# Patient Record
Sex: FEMALE | Race: BLACK OR AFRICAN AMERICAN | Employment: OTHER | URBAN - METROPOLITAN AREA
[De-identification: names, ages, dates, MRNs, and addresses within clinical notes are randomized per-mention and may not be internally consistent; named-entity substitution may affect disease eponyms.]

---

## 2017-01-04 ENCOUNTER — HOSPITAL ENCOUNTER (OUTPATIENT)
Dept: MAMMOGRAPHY | Age: 57
Discharge: HOME | End: 2017-01-04
Attending: FAMILY MEDICINE
Payer: COMMERCIAL

## 2017-01-04 DIAGNOSIS — Z12.31 VISIT FOR SCREENING MAMMOGRAM: ICD-10-CM

## 2017-01-04 PROCEDURE — 77067 SCR MAMMO BI INCL CAD: CPT

## 2017-01-23 ENCOUNTER — OFFICE VISIT (OUTPATIENT)
Dept: ORTHOPEDIC SURGERY | Age: 57
End: 2017-01-23

## 2017-01-23 VITALS
RESPIRATION RATE: 14 BRPM | DIASTOLIC BLOOD PRESSURE: 84 MMHG | HEIGHT: 62 IN | SYSTOLIC BLOOD PRESSURE: 130 MMHG | WEIGHT: 203.6 LBS | HEART RATE: 71 BPM | TEMPERATURE: 98 F | BODY MASS INDEX: 37.47 KG/M2

## 2017-01-23 DIAGNOSIS — M51.36 DDD (DEGENERATIVE DISC DISEASE), LUMBAR: ICD-10-CM

## 2017-01-23 DIAGNOSIS — M54.50 ACUTE MIDLINE LOW BACK PAIN WITHOUT SCIATICA: Primary | ICD-10-CM

## 2017-01-23 DIAGNOSIS — M47.816 LUMBAR FACET ARTHROPATHY: ICD-10-CM

## 2017-01-23 DIAGNOSIS — M62.830 MUSCLE SPASM OF BACK: ICD-10-CM

## 2017-01-23 RX ORDER — PREDNISONE 10 MG/1
TABLET ORAL
Qty: 11 TAB | Refills: 0 | Status: SHIPPED | OUTPATIENT
Start: 2017-01-23 | End: 2017-03-06 | Stop reason: ALTCHOICE

## 2017-01-23 RX ORDER — METHOCARBAMOL 750 MG/1
750 TABLET, FILM COATED ORAL 4 TIMES DAILY
Qty: 60 TAB | Refills: 1 | Status: SHIPPED | OUTPATIENT
Start: 2017-01-23 | End: 2017-03-06 | Stop reason: SDUPTHER

## 2017-01-23 NOTE — PROGRESS NOTES
[facility]  [address] [place], [address]  Phone: [phone]  Fax: [phone]          HISTORY OF PRESENT ILLNESS:  Jose Roberto Wick is a 60 y.o. female with history of cervical pain. Pt was last seen about 2 years ago for cervical pain. Today she c/o lower back pain but denies any pain radiating down the legs. Pt states that prior to [place] she woke up with severe pain in the lower back and muscle spasms. She went to the ER and was prescribed Flexeril. Pt states that she her pain wore off, she experienced some  muscle spasms in the legs. She reports that her pain improved but she experienced an acute exacerbation of pain again about 2 weeks ago. Pt reports that her lower back pain is most severe when waking in the morning. She has been taking ibuprofen 800 mg with benefit. Pt admits that it takes some time for the Flexeril to become effective. She admits to sedation when first taking the Flexeril. Pt has tried Robaxin in the past but is unsure if it was effective. Pt at this time desires to proceed with medication evaluation. Pain Scale: 1/10    PCP: Juan Diego Cuevas MD       Past Medical History   Diagnosis Date    Cellulitis of breast, left     Degenerative cervical disc     Dyslipidemia     Goiter     History of echocardiogram 12/16/2011     EF 65-70%. RVSP 30-35. No significant valvular heart disease.  Hypercholesterolemia     Hypertension     Hypertension     Left leg pain     Lower extremity peripheral arterial testing 01/30/2012     No peripheral arterial disease bilaterally at rest.  Exercise deferred. R IVORY 1.06.  L IVORY 1.06.    Normal nuclear stress test 08/11/2005     No ischemia or prior infarction. EF 76%. No reg'l WMA. Neg EKG on max EST. Ex time 7 mins.     Thyroid disease         Social History     Social History    Marital status: SINGLE     Spouse name: N/A    Number of children: N/A    Years of education: N/A     Occupational History    Not on file. Social History Main Topics    Smoking status: Former Smoker     Packs/day: 0.50     Years: 15.00     Quit date: 12/16/2010    Smokeless tobacco: Never Used    Alcohol use Yes      Comment: occasionally    Drug use: No    Sexual activity: Not on file     Other Topics Concern    Not on file     Social History Narrative       Current Outpatient Prescriptions   Medication Sig Dispense Refill    predniSONE (DELTASONE) 10 mg tablet 2 pills in am for 3 days, then 1 pill in am for 3 days and 1/2 pill in am for remainder 11 Tab 0    methocarbamol (ROBAXIN) 750 mg tablet Take 1 Tab by mouth four (4) times daily. Indications: MUSCLE SPASM 60 Tab 1    methocarbamol (ROBAXIN-750) 750 mg tablet Take 1 Tab by mouth three (3) times daily. 15 Tab 0    cholecalciferol, vitamin D3, (VITAMIN D3) 2,000 unit tab Take  by mouth.  esomeprazole (NEXIUM) 40 mg capsule Take  by mouth daily.  amLODIPine-benazepril (LOTREL) 5-40 mg per capsule Take 1 Cap by mouth daily.  aspirin 81 mg tablet Take 81 mg by mouth daily.  Calcium Carbonate-Vit D3-Min (CALTRATE 600+D PLUS MINERALS) 600 mg calcium- 400 unit Tab Take  by mouth two (2) times a day.  simvastatin (ZOCOR) 40 mg tablet Take 40 mg by mouth nightly. Allergies   Allergen Reactions    Ampicillin Rash    Codeine Itching    Ibuprofen Nausea Only    Pcn [Penicillins] Rash    Sulfa (Sulfonamide Antibiotics) Itching    Vicodin [Hydrocodone-Acetaminophen] Itching         REVIEW OF SYSTEMS    Constitutional: Negative for fever, chills, or weight change. Respiratory: Negative for cough or shortness of breath. Cardiovascular: Negative for chest pain or palpitations. Gastrointestinal: Negative for acid reflux, change in bowel habits, or constipation. Genitourinary: Negative for dysuria and flank pain. Musculoskeletal: Positive for lumbar pain. Skin: Negative for rash. Neurological: Negative for headaches, dizziness, or numbness. Endo/Heme/Allergies: Negative for increased bruising. Psychiatric/Behavioral: Negative for difficulty with sleep. PHYSICAL EXAMINATION  Visit Vitals    /84    Pulse 71    Temp 98 °F (36.7 °C) (Oral)    Resp 14    Ht 5' 2\" (1.575 m)    Wt 203 lb 9.6 oz (92.4 kg)    BMI 37.24 kg/m2       Constitutional: Awake, alert, and in no acute distress  Neurological: 1+ symmetrical DTRs in the lower extremities. Sensation to light touch is intact. Skin: warm, dry, and intact. Musculoskeletal: Tenderness to palpation in the lower lumbar region. Mild pain with extension, axial loading, no pain with forward flexion. No pain with internal or external rotation of her hips. Negative straight leg raise bilaterally. Hip Flex  Quads Hamstrings Ankle DF EHL Ankle PF   Right +4/5 +4/5 +4/5 +4/5 +4/5 +4/5   Left +4/5 +4/5 +4/5 +4/5 +4/5 +4/5     IMAGING:    Cervical Spine MRI from 06/24/2015 was personally reviewed with the Pt and demonstrated:    Results from Hospital Encounter encounter on 06/24/15   MRI CERV SPINE WO CONT   Narrative MRI Of Cervical Spine Without Contrast    CPT CODE: 63314    HISTORY: Increased left upper extremity radiculopathy and weakness, C6 and C7  distribution. COMPARISON: None. TECHNIQUE: Sagittal T1, T2, STIR; axial T2 and axial T2 medic. FINDINGS:     Status post C5-6 anterior cervical interbody fusion. Normal alignment and  vertebral body heights maintained. No pathologic marrow signal is seen. No cord  signal abnormality. Visualized posterior fossa contents are unremarkable. Paravertebral space is unremarkable. Partially imaged heterogeneous T2  hyperintense left paratracheal mass measuring roughly 2 cm may represent a  thyroid nodule. Spinal canal and neural foramen:     C2-3: Tiny central disc protrusion without spinal stenosis.     C3-4: Small central disc protrusion causing mild spinal stenosis and minimally  indenting the ventral cord. No foraminal stenosis. C4-5: Nonfocal disc protrusion with marginal osteophytic bar and uncovertebral  spurring. Bilateral facet hypertrophy. Mild to moderate spinal canal stenosis. Moderate bilateral foraminal stenoses. C5-6: Status post fusion with no spinal canal or foraminal stenosis. C6-7: Moderately sized central disc extrusion causing mild to moderate spinal  stenosis and mildly flattening the ventral cord. Uncovertebral spurring more  prominent on the left causing moderate foraminal stenosis. C7-T1: No significant disc disease or spinal stenosis. Impression IMPRESSION:    Central disc protrusion causing mild spinal stenosis at C3-4. Nonfocal disc protrusion and marginal osteophyte at C4-5 causing mild to  moderate spinal canal and moderate bilateral foraminal stenoses. Moderately sized disc extrusion at C6-7 causing mild to moderate spinal stenosis  and uncovertebral spurring causing moderate left foraminal stenosis. C5-6 ACDF with no spinal stenosis at this level. Lumbar Spine 4V X-rays from 01/23/2017 were personally reviewed with the Pt and demonstrated:  1) Multilevel degenerative facets. 2) Mild degenerative disc at L5-S1. 3) Calcification aorta. 4) Good alignment. Robert Pearce was seen today for back pain and follow-up. Diagnoses and all orders for this visit:    Acute midline low back pain without sciatica  -     [[id number]] LS Spine 4V  -     predniSONE (DELTASONE) 10 mg tablet; 2 pills in am for 3 days, then 1 pill in am for 3 days and 1/2 pill in am for remainder  -     methocarbamol (ROBAXIN) 750 mg tablet; Take 1 Tab by mouth four (4) times daily. Indications:  MUSCLE SPASM    Lumbar facet arthropathy (HCC)  -     predniSONE (DELTASONE) 10 mg tablet; 2 pills in am for 3 days, then 1 pill in am for 3 days and 1/2 pill in am for remainder    DDD (degenerative disc disease), lumbar    Muscle spasm of back  - methocarbamol (ROBAXIN) 750 mg tablet; Take 1 Tab by mouth four (4) times daily. Indications: MUSCLE SPASM         IMPRESSION AND PLAN:  Librado Keller is a 60 y.o. female with history of cervical pain. Pt was last seen about 2 years ago for cervical pain but today she c/o lower back pain. Pt states that prior to [place] she woke up with severe pain in the lower back and muscle spasms. She went to the ER and was prescribed Robaxin 750 mg. She states that her pain improved but she experienced an acute exacerbation of pain again about 2 weeks ago. Pt reports that her lower back pain is most severe when waking in the morning. 1) Pt was given information on lumbar exercises. 2) She was offered physical therapy but declined. 3) Pt was prescribed a prednisone taper. 4) She will continue taking ibuprofen 800 mg as prescribed but I instructed her to take no more than 2 tabs daily. 5) Pt was prescribed Robaxin 750 mg 1 tab 4 x daily prn muscle spasm. 6) I recommended the Pt try water exercise. 7) Ms. Jayna John has a reminder for a \"due or due soon\" health maintenance. I have asked that she contact her primary care provider, Scott Jara MD,  for follow-up on this health maintenance. 9)  reviewed. 9) Pt will follow-up as needed.       Written by Heath Natarajan, as dictated by Karolyn Luz MD.

## 2017-01-23 NOTE — MR AVS SNAPSHOT
Visit Information Date & Time Provider Department Dept. Phone Encounter #  
 1/23/2017  8:45 AM Louis Newman MD [facility] [phone] [id number] Follow-up Instructions Return if symptoms worsen or fail to improve. Routing History Upcoming Health Maintenance Date Due Hepatitis C Screening 1960 DTaP/Tdap/Td series (1 - Tdap) 7/10/1979 PAP AKA CERVICAL CYTOLOGY 7/10/1979 FOBT Q 1 YEAR AGE 50-75 7/10/2008 INFLUENZA AGE 9 TO ADULT 8/1/2016 BREAST CANCER SCRN MAMMOGRAM 1/4/2019 Allergies as of 1/23/2017  Review Complete On: 1/23/2017 By: Louis Newman MD  
  
 Severity Noted Reaction Type Reactions Ampicillin  08/26/2011    Rash Codeine  08/26/2011    Itching Ibuprofen    Nausea Only Pcn [Penicillins]  08/26/2011    Rash  
 Sulfa (Sulfonamide Antibiotics)  08/26/2011    Itching Vicodin [Hydrocodone-acetaminophen]  02/25/2013   Side Effect Itching Current Immunizations  Never Reviewed No immunizations on file. Not reviewed this visit You Were Diagnosed With   
  
 Codes Comments Acute midline low back pain without sciatica    -  Primary ICD-10-CM: M54.5 ICD-9-CM: 724.2 Lumbar facet arthropathy (HCC)     ICD-10-CM: M12.88 ICD-9-CM: 721.3 DDD (degenerative disc disease), lumbar     ICD-10-CM: M51.36 
ICD-9-CM: 722.52 Muscle spasm of back     ICD-10-CM: Y12.832 ICD-9-CM: 724.8 Vitals BP Pulse Temp Resp Height(growth percentile) Weight(growth percentile) 130/84 71 98 °F (36.7 °C) (Oral) 14 5' 2\" (1.575 m) 203 lb 9.6 oz (92.4 kg) BMI OB Status Smoking Status 37.24 kg/m2 Hysterectomy Former Smoker BMI and BSA Data Body Mass Index Body Surface Area  
 37.24 kg/m 2 2.01 m 2 Preferred Pharmacy Pharmacy Name Phone [phone] [address] [phone] Your Updated Medication List  
  
   
This list is accurate as of: 17 10:32 AM.  Always use your most recent med list. amLODIPine-benazepril 5-40 mg per capsule Commonly known as:  Lissy Muff Take 1 Cap by mouth daily. aspirin 81 mg tablet Take 81 mg by mouth daily. CALTRATE 600+D PLUS MINERALS 600 mg calcium- 400 unit Tab Generic drug:  Calcium Carbonate-Vit D3-Min Take  by mouth two (2) times a day. * methocarbamol 750 mg tablet Commonly known as:  [id number]-344 Take 1 Tab by mouth three (3) times daily. * methocarbamol 750 mg tablet Commonly known as:  ROBAXIN Take 1 Tab by mouth four (4) times daily. Indications: MUSCLE SPASM NexIUM 40 mg capsule Generic drug:  esomeprazole Take  by mouth daily. predniSONE 10 mg tablet Commonly known as:  DELTASONE  
2 pills in am for 3 days, then 1 pill in am for 3 days and 1/2 pill in am for remainder  
  
 simvastatin 40 mg tablet Commonly known as:  ZOCOR Take 40 mg by mouth nightly. VITAMIN D3 2,000 unit Tab Generic drug:  cholecalciferol (vitamin D3) Take  by mouth. * Notice: This list has 2 medication(s) that are the same as other medications prescribed for you. Read the directions carefully, and ask your doctor or other care provider to review them with you. Prescriptions Printed Refills  
 methocarbamol (ROBAXIN) 750 mg tablet 1 Sig: Take 1 Tab by mouth four (4) times daily. Indications: MUSCLE SPASM Class: Print Route: Oral  
  
Prescriptions Sent to Pharmacy Refills  
 predniSONE (DELTASONE) 10 mg tablet 0 Si pills in am for 3 days, then 1 pill in am for 3 days and 1/2 pill in am for remainder Class: Normal  
 Pharmacy: [address]  #: [phone] We Performed the Following AMB POC XRAY, SPINE, LUMBOSACRAL; 4+ [id number] CPT(R)] Follow-up Instructions Return if symptoms worsen or fail to improve. Patient Instructions Low Back Arthritis: Exercises Your Care Instructions Here are some examples of typical rehabilitation exercises for your condition. Start each exercise slowly. Ease off the exercise if you start to have pain. Your doctor or physical therapist will tell you when you can start these exercises and which ones will work best for you. When you are not being active, find a comfortable position for rest. Some people are comfortable on the floor or a medium-firm bed with a small pillow under their head and another under their knees. Some people prefer to lie on their side with a pillow between their knees. Don't stay in one position for too long. Take short walks (10 to 20 minutes) every 2 to 3 hours. Avoid slopes, hills, and stairs until you feel better. Walk only distances you can manage without pain, especially leg pain. How to do the exercises Pelvic tilt 1. Lie on your back with your knees bent. 2. \"Brace\" your stomachtighten your muscles by pulling in and imagining your belly button moving toward your spine. 3. Press your lower back into the floor. You should feel your hips and pelvis rock back. 4. Hold for 6 seconds while breathing smoothly. 5. Relax and allow your pelvis and hips to rock forward. 6. Repeat 8 to 12 times. Back stretches 1. Get down on your hands and knees on the floor. 2. Relax your head and allow it to droop. Round your back up toward the ceiling until you feel a nice stretch in your upper, middle, and lower back. Hold this stretch for as long as it feels comfortable, or about 15 to 30 seconds. 3. Return to the starting position with a flat back while you are on your hands and knees. 4. Let your back sway by pressing your stomach toward the floor. Lift your buttocks toward the ceiling. 5. Hold this position for 15 to 30 seconds. 6. Repeat 2 to 4 times. Follow-up care is a key part of your treatment and safety. Be sure to make and go to all appointments, and call your doctor if you are having problems. It's also a good idea to know your test results and keep a list of the medicines you take. Where can you learn more? Go to http://wilson-josue.info/. Enter V312 in the search box to learn more about \"Low Back Arthritis: Exercises. \" Current as of: May 23, 2016 Content Version: 11.1 © 9105-1347 [employer]. Care instructions adapted under license by [employer] (which disclaims liability or warranty for this information). If you have questions about a medical condition or this instruction, always ask your healthcare professional. [address] any warranty or liability for your use of this information. Introducing [facility]! Cheko Mccullough introduces [employer] patient portal. Now you can access parts of your medical record, email your doctor's office, and request medication refills online. 1. In your internet browser, go to [email or web address] 2. Click on the First Time User? Click Here link in the Sign In box. You will see the New Member Sign Up page. 3. Enter your [employer] Access Code exactly as it appears below. You will not need to use this code after youve completed the sign-up process. If you do not sign up before the expiration date, you must request a new code. · [employer] Access Code: [email or web address] Expires: 1/25/2017 12:36 PM 
 
4. Enter the last four digits of your Social Security Number (xxxx) and Date of Birth (mm/dd/yyyy) as indicated and click Submit. You will be taken to the next sign-up page. 5. Create a cuaQeat ID. This will be your [employer] login ID and cannot be changed, so think of one that is secure and easy to remember. 6. Create a cuaQeat password. You can change your password at any time. 7. Enter your Password Reset Question and Answer. This can be used at a later time if you forget your password. 8. Enter your e-mail address. You will receive e-mail notification when new information is available in [address] 9. Click Sign Up. You can now view and download portions of your medical record. 10. Click the Download Summary menu link to download a portable copy of your medical information. If you have questions, please visit the Frequently Asked Questions section of the [employer] website. Remember, [employer] is NOT to be used for urgent needs. For medical emergencies, dial 911. Now available from your iPhone and Android! Please provide this summary of care documentation to your next provider. Your primary care clinician is listed as Charito Reddy. If you have any questions after today's visit, please call [phone].

## 2017-01-23 NOTE — PATIENT INSTRUCTIONS
Low Back Arthritis: Exercises  Your Care Instructions  Here are some examples of typical rehabilitation exercises for your condition. Start each exercise slowly. Ease off the exercise if you start to have pain. Your doctor or physical therapist will tell you when you can start these exercises and which ones will work best for you. When you are not being active, find a comfortable position for rest. Some people are comfortable on the floor or a medium-firm bed with a small pillow under their head and another under their knees. Some people prefer to lie on their side with a pillow between their knees. Don't stay in one position for too long. Take short walks (10 to 20 minutes) every 2 to 3 hours. Avoid slopes, hills, and stairs until you feel better. Walk only distances you can manage without pain, especially leg pain. How to do the exercises  Pelvic tilt    1. Lie on your back with your knees bent. 2. \"Brace\" your stomach--tighten your muscles by pulling in and imagining your belly button moving toward your spine. 3. Press your lower back into the floor. You should feel your hips and pelvis rock back. 4. Hold for 6 seconds while breathing smoothly. 5. Relax and allow your pelvis and hips to rock forward. 6. Repeat 8 to 12 times. Back stretches    1. Get down on your hands and knees on the floor. 2. Relax your head and allow it to droop. Round your back up toward the ceiling until you feel a nice stretch in your upper, middle, and lower back. Hold this stretch for as long as it feels comfortable, or about 15 to 30 seconds. 3. Return to the starting position with a flat back while you are on your hands and knees. 4. Let your back sway by pressing your stomach toward the floor. Lift your buttocks toward the ceiling. 5. Hold this position for 15 to 30 seconds. 6. Repeat 2 to 4 times. Follow-up care is a key part of your treatment and safety.  Be sure to make and go to all appointments, and call your doctor if you are having problems. It's also a good idea to know your test results and keep a list of the medicines you take. Where can you learn more? Go to http://wilson-josue.info/. Enter F662 in the search box to learn more about \"Low Back Arthritis: Exercises. \"  Current as of: May 23, 2016  Content Version: 11.1  © 5058-0005 [employer]. Care instructions adapted under license by [employer] (which disclaims liability or warranty for this information). If you have questions about a medical condition or this instruction, always ask your healthcare professional. [address] any warranty or liability for your use of this information.

## 2017-03-06 ENCOUNTER — OFFICE VISIT (OUTPATIENT)
Dept: ORTHOPEDIC SURGERY | Age: 57
End: 2017-03-06

## 2017-03-06 VITALS
HEART RATE: 95 BPM | HEIGHT: 62 IN | WEIGHT: 200 LBS | TEMPERATURE: 97.8 F | RESPIRATION RATE: 18 BRPM | BODY MASS INDEX: 36.8 KG/M2 | DIASTOLIC BLOOD PRESSURE: 74 MMHG | SYSTOLIC BLOOD PRESSURE: 108 MMHG

## 2017-03-06 DIAGNOSIS — M62.830 MUSCLE SPASM OF BACK: ICD-10-CM

## 2017-03-06 DIAGNOSIS — M54.50 ACUTE MIDLINE LOW BACK PAIN WITHOUT SCIATICA: ICD-10-CM

## 2017-03-06 DIAGNOSIS — M51.36 DDD (DEGENERATIVE DISC DISEASE), LUMBAR: ICD-10-CM

## 2017-03-06 DIAGNOSIS — M62.838 MUSCLE SPASM: ICD-10-CM

## 2017-03-06 DIAGNOSIS — M47.816 LUMBAR FACET ARTHROPATHY: Primary | ICD-10-CM

## 2017-03-06 RX ORDER — CYCLOBENZAPRINE HCL 10 MG
TABLET ORAL
COMMUNITY
End: 2017-04-20 | Stop reason: ALTCHOICE

## 2017-03-06 RX ORDER — METHOCARBAMOL 750 MG/1
750 TABLET, FILM COATED ORAL 4 TIMES DAILY
Qty: 60 TAB | Refills: 1 | Status: SHIPPED | OUTPATIENT
Start: 2017-03-06 | End: 2019-05-02 | Stop reason: ALTCHOICE

## 2017-03-06 RX ORDER — PREDNISONE 10 MG/1
TABLET ORAL
Qty: 11 TAB | Refills: 0 | Status: SHIPPED | OUTPATIENT
Start: 2017-03-06 | End: 2017-05-24 | Stop reason: ALTCHOICE

## 2017-03-06 RX ORDER — TRAMADOL HYDROCHLORIDE 50 MG/1
TABLET ORAL
Qty: 30 TAB | Refills: 0 | Status: SHIPPED | OUTPATIENT
Start: 2017-03-06 | End: 2017-04-20 | Stop reason: SDUPTHER

## 2017-03-06 NOTE — PATIENT INSTRUCTIONS
Low Back Arthritis: Exercises  Your Care Instructions  Here are some examples of typical rehabilitation exercises for your condition. Start each exercise slowly. Ease off the exercise if you start to have pain. Your doctor or physical therapist will tell you when you can start these exercises and which ones will work best for you. When you are not being active, find a comfortable position for rest. Some people are comfortable on the floor or a medium-firm bed with a small pillow under their head and another under their knees. Some people prefer to lie on their side with a pillow between their knees. Don't stay in one position for too long. Take short walks (10 to 20 minutes) every 2 to 3 hours. Avoid slopes, hills, and stairs until you feel better. Walk only distances you can manage without pain, especially leg pain. How to do the exercises  Pelvic tilt    1. Lie on your back with your knees bent. 2. \"Brace\" your stomach--tighten your muscles by pulling in and imagining your belly button moving toward your spine. 3. Press your lower back into the floor. You should feel your hips and pelvis rock back. 4. Hold for 6 seconds while breathing smoothly. 5. Relax and allow your pelvis and hips to rock forward. 6. Repeat 8 to 12 times. Back stretches    1. Get down on your hands and knees on the floor. 2. Relax your head and allow it to droop. Round your back up toward the ceiling until you feel a nice stretch in your upper, middle, and lower back. Hold this stretch for as long as it feels comfortable, or about 15 to 30 seconds. 3. Return to the starting position with a flat back while you are on your hands and knees. 4. Let your back sway by pressing your stomach toward the floor. Lift your buttocks toward the ceiling. 5. Hold this position for 15 to 30 seconds. 6. Repeat 2 to 4 times. Follow-up care is a key part of your treatment and safety.  Be sure to make and go to all appointments, and call your doctor if you are having problems. It's also a good idea to know your test results and keep a list of the medicines you take. Where can you learn more? Go to http://wilson-josue.info/. Enter H975 in the search box to learn more about \"Low Back Arthritis: Exercises. \"  Current as of: May 23, 2016  Content Version: 11.1  © 6210-6755 [employer]. Care instructions adapted under license by [employer] (which disclaims liability or warranty for this information). If you have questions about a medical condition or this instruction, always ask your healthcare professional. [place] [address] any warranty or liability for your use of this information.

## 2017-03-06 NOTE — LETTER
NOTIFICATION RETURN TO WORK / SCHOOL 
 
3/6/2017 2:52 PM 
 
Ms. Anitra Jefferson [address] [place] [id number] To Whom It May Concern: 
 
Anitra Jefferson is currently under the care of [address] [facility]. She was seen in our office today and may return to work on 03/07/2017. If there are questions or concerns please have the patient contact our office. Sincerely, Flori Cuevas MD

## 2017-03-06 NOTE — PROGRESS NOTES
[facility]  [address] [place], [address]  Phone: [phone]  Fax: [phone]          HISTORY OF PRESENT ILLNESS:  Jessica Ruff is a 60 y.o. female with history of cervical pain. Pt states that she completed her Medrol Dosepak and then started experiencing pain in the lower and back 2 days after completing the medication. She denies any inciting injuries and reports that her pain was so severe on Friday (03/03/2017) that she was unable to sit up to eat due to pain. Pt denies any hematuria. She reports significant improvement in her pain since Friday but continues to experience some soreness in the lower back at this time. Pt tried Robaxin 750 mg and Flexeril 10 mg Friday with slight relief. She admits to sedation when taking the Flexeril. Pt denies ever trying physical therapy. Of note, Pt is not diabetic. Pt at this time desires to proceed with medication evaluation. Of note, Pt is not allergic to ibuprofen and wishes to have it removed from her allergy list at this. She admits to experiencing nausea once when taking ibuprofen on an empty stomach. Pain Scale: 4/10    PCP: Ivelisse Amaral MD       Past Medical History:   Diagnosis Date    Cellulitis of breast, left     Degenerative cervical disc     Dyslipidemia     Goiter     History of echocardiogram 12/16/2011    EF 65-70%. RVSP 30-35. No significant valvular heart disease.  Hypercholesterolemia     Hypertension     Hypertension     Left leg pain     Lower extremity peripheral arterial testing 01/30/2012    No peripheral arterial disease bilaterally at rest.  Exercise deferred. R IVORY 1.06.  L IVORY 1.06.    Normal nuclear stress test 08/11/2005    No ischemia or prior infarction. EF 76%. No reg'l WMA. Neg EKG on max EST. Ex time 7 mins.     Thyroid disease         Social History     Social History    Marital status: SINGLE     Spouse name: N/A   [facility] Number of children: N/A    Years of education: N/A     Occupational History    Not on file. Social History Main Topics    Smoking status: Former Smoker     Packs/day: 0.50     Years: 15.00     Quit date: 12/16/2010    Smokeless tobacco: Never Used    Alcohol use Yes      Comment: occasionally    Drug use: No    Sexual activity: Not on file     Other Topics Concern    Not on file     Social History Narrative       Current Outpatient Prescriptions   Medication Sig Dispense Refill    cyclobenzaprine (FLEXERIL) 10 mg tablet Take  by mouth three (3) times daily as needed for Muscle Spasm(s).  methocarbamol (ROBAXIN) 750 mg tablet Take 1 Tab by mouth four (4) times daily. Indications: MUSCLE SPASM 60 Tab 1    predniSONE (DELTASONE) 10 mg tablet 2 pills in am for 3 days, then 1 pill in am for 3 days and 1/2 pill in am for remainder 11 Tab 0    traMADol (ULTRAM) 50 mg tablet 1 po bid prn severe pain  Indications: NEUROPATHIC PAIN, Pain 30 Tab 0    cholecalciferol, vitamin D3, (VITAMIN D3) 2,000 unit tab Take  by mouth.  esomeprazole (NEXIUM) 40 mg capsule Take  by mouth daily.  amLODIPine-benazepril (LOTREL) 5-40 mg per capsule Take 1 Cap by mouth daily.  aspirin 81 mg tablet Take 81 mg by mouth daily.  Calcium Carbonate-Vit D3-Min (CALTRATE 600+D PLUS MINERALS) 600 mg calcium- 400 unit Tab Take  by mouth two (2) times a day.  simvastatin (ZOCOR) 40 mg tablet Take 40 mg by mouth nightly.  methocarbamol (ROBAXIN-750) 750 mg tablet Take 1 Tab by mouth three (3) times daily. 15 Tab 0       Allergies   Allergen Reactions    Ampicillin Rash    Codeine Itching    Pcn [Penicillins] Rash    Sulfa (Sulfonamide Antibiotics) Itching    Vicodin [Hydrocodone-Acetaminophen] Itching         REVIEW OF SYSTEMS    Constitutional: Negative for fever, chills, or weight change. Respiratory: Negative for cough or shortness of breath.      Cardiovascular: Negative for chest pain or palpitations. Gastrointestinal: Negative for acid reflux, change in bowel habits, or constipation. Genitourinary: Negative for dysuria and flank pain. Musculoskeletal: Positive for lumbar pain. Skin: Negative for rash. Neurological: Negative for headaches, dizziness, or numbness. Endo/Heme/Allergies: Negative for increased bruising. Psychiatric/Behavioral: Negative for difficulty with sleep. PHYSICAL EXAMINATION  Visit Vitals    /74    Pulse 95    Temp 97.8 °F (36.6 °C) (Oral)    Resp 18    Ht 5' 2\" (1.575 m)    Wt 200 lb (90.7 kg)    BMI 36.58 kg/m2       Constitutional: Awake, alert, and in no acute distress  Neurological: 1+ symmetrical DTRs in the upper extremities. 1+ symmetrical DTRs in the lower extremities. Sensation to light touch is intact. Negative Nick's sign bilaterally. Skin: warm, dry, and intact. Musculoskeletal: Tenderness to palpation in the lower lumbar region. Moderate pain with extension and axial loading. No pain with internal or external rotation of her hips. Negative straight leg raise bilaterally. Hip Flex  Quads Hamstrings Ankle DF EHL Ankle PF   Right +4/5 +4/5 +4/5 +4/5 +4/5 +4/5   Left +4/5 +4/5 +4/5 +4/5 +4/5 +4/5     IMAGING:    Cervical Spine MRI from 06/24/2015 was personally reviewed with the Pt and demonstrated:    Results from Hospital Encounter encounter on 06/24/15   MRI CERV SPINE WO CONT   Narrative MRI Of Cervical Spine Without Contrast    CPT CODE: 57671    HISTORY: Increased left upper extremity radiculopathy and weakness, C6 and C7  distribution. COMPARISON: None. TECHNIQUE: Sagittal T1, T2, STIR; axial T2 and axial T2 medic. FINDINGS:     Status post C5-6 anterior cervical interbody fusion. Normal alignment and  vertebral body heights maintained. No pathologic marrow signal is seen. No cord  signal abnormality. Visualized posterior fossa contents are unremarkable. Paravertebral space is unremarkable.  Partially imaged heterogeneous T2  hyperintense left paratracheal mass measuring roughly 2 cm may represent a  thyroid nodule. Spinal canal and neural foramen:     C2-3: Tiny central disc protrusion without spinal stenosis. C3-4: Small central disc protrusion causing mild spinal stenosis and minimally  indenting the ventral cord. No foraminal stenosis. C4-5: Nonfocal disc protrusion with marginal osteophytic bar and uncovertebral  spurring. Bilateral facet hypertrophy. Mild to moderate spinal canal stenosis. Moderate bilateral foraminal stenoses. C5-6: Status post fusion with no spinal canal or foraminal stenosis. C6-7: Moderately sized central disc extrusion causing mild to moderate spinal  stenosis and mildly flattening the ventral cord. Uncovertebral spurring more  prominent on the left causing moderate foraminal stenosis. C7-T1: No significant disc disease or spinal stenosis. Impression IMPRESSION:    Central disc protrusion causing mild spinal stenosis at C3-4. Nonfocal disc protrusion and marginal osteophyte at C4-5 causing mild to  moderate spinal canal and moderate bilateral foraminal stenoses. Moderately sized disc extrusion at C6-7 causing mild to moderate spinal stenosis  and uncovertebral spurring causing moderate left foraminal stenosis. C5-6 ACDF with no spinal stenosis at this level. Lumbar Spine 4V X-rays from 01/23/2017 were personally reviewed with the Pt and demonstrated:  1) Multilevel degenerative facets. 2) Mild degenerative disc at L5-S1. 3) Calcification aorta. 4) Good alignment    ASSESSMENT   Joanne Gaucher was seen today for back pain. Diagnoses and all orders for this visit:    Lumbar facet arthropathy (HCC)  -     predniSONE (DELTASONE) 10 mg tablet; 2 pills in am for 3 days, then 1 pill in am for 3 days and 1/2 pill in am for remainder    Acute midline low back pain without sciatica  -     methocarbamol (ROBAXIN) 750 mg tablet;  Take 1 Tab by mouth four (4) times daily. Indications: MUSCLE SPASM  -     predniSONE (DELTASONE) 10 mg tablet; 2 pills in am for 3 days, then 1 pill in am for 3 days and 1/2 pill in am for remainder  -     traMADol (ULTRAM) 50 mg tablet; 1 po bid prn severe pain  Indications: NEUROPATHIC PAIN, Pain    Muscle spasm  -     methocarbamol (ROBAXIN) 750 mg tablet; Take 1 Tab by mouth four (4) times daily. Indications: MUSCLE SPASM  -     traMADol (ULTRAM) 50 mg tablet; 1 po bid prn severe pain  Indications: NEUROPATHIC PAIN, Pain    DDD (degenerative disc disease), lumbar    Muscle spasm of back         IMPRESSION AND PLAN:  Wayne Bustamante is a 60 y.o. female with history of cervical pain. Pt states that she completed her Medrol Dosepak and then started experiencing pain in the lower and back 2 days after completing the medication. She reports significant improvement in her pain since Friday but continues to experience some soreness in the lower back at this time. 1) Pt was given information on lumbar arthritis exercises. 2) She received a refill of Robaxin 750 mg 1 tab 4 x daily prn muscle spasm. 3) Pt was prescribed a prednisone taper prn severe pain. 4) She was prescribed Ultram 50 mg 1 tab BID prn severe pain. 5) Ms. Shahrzad Coyne has a reminder for a \"due or due soon\" health maintenance. I have asked that she contact her primary care provider, Autumn Capellan MD, for follow-up on this health maintenance. 6)  reviewed. 7) Pt will follow-up in 1 month.       Written by Iva Willingham, as dictated by Eduar Brown MD.

## 2017-03-06 NOTE — MR AVS SNAPSHOT
Visit Information Date & Time Provider Department Dept. Phone Encounter #  
 3/6/2017  2:15 PM Kenyatta Sadler MD [facility] [phone] [id number] Follow-up Instructions Return in about 1 month (around 4/6/2017) for Medication follow up. Upcoming Health Maintenance Date Due Hepatitis C Screening 1960 DTaP/Tdap/Td series (1 - Tdap) 7/10/1979 PAP AKA CERVICAL CYTOLOGY 7/10/1979 FOBT Q 1 YEAR AGE 50-75 7/10/2008 INFLUENZA AGE 9 TO ADULT 8/1/2016 BREAST CANCER SCRN MAMMOGRAM 1/4/2019 Allergies as of 3/6/2017  Review Complete On: 3/6/2017 By: Kenyatta Sadler MD  
  
 Severity Noted Reaction Type Reactions Ampicillin  08/26/2011    Rash Codeine  08/26/2011    Itching Ibuprofen    Nausea Only Pcn [Penicillins]  08/26/2011    Rash  
 Sulfa (Sulfonamide Antibiotics)  08/26/2011    Itching Vicodin [Hydrocodone-acetaminophen]  02/25/2013   Side Effect Itching Current Immunizations  Never Reviewed No immunizations on file. Not reviewed this visit You Were Diagnosed With   
  
 Codes Comments Lumbar facet arthropathy (HCC)    -  Primary ICD-10-CM: M12.88 ICD-9-CM: 721.3 Acute midline low back pain without sciatica     ICD-10-CM: M54.5 ICD-9-CM: 724.2 Muscle spasm     ICD-10-CM: K55.444 ICD-9-CM: 728.85 DDD (degenerative disc disease), lumbar     ICD-10-CM: M51.36 
ICD-9-CM: 722.52 Muscle spasm of back     ICD-10-CM: B99.294 ICD-9-CM: 724.8 Vitals BP Pulse Temp Resp Height(growth percentile) Weight(growth percentile) 108/74 95 97.8 °F (36.6 °C) (Oral) 18 5' 2\" (1.575 m) 200 lb (90.7 kg) BMI OB Status Smoking Status 36.58 kg/m2 Hysterectomy Former Smoker BMI and BSA Data Body Mass Index Body Surface Area  
 36.58 kg/m 2 1.99 m 2 Preferred Pharmacy Pharmacy Name Phone [facility] [address] [phone] Your Updated Medication List  
  
   
This list is accurate as of: 3/6/17  2:57 PM.  Always use your most recent med list. amLODIPine-benazepril 5-40 mg per capsule Commonly known as:  Moi Setters Take 1 Cap by mouth daily. aspirin 81 mg tablet Take 81 mg by mouth daily. CALTRATE 600+D PLUS MINERALS 600 mg calcium- 400 unit Tab Generic drug:  Calcium Carbonate-Vit D3-Min Take  by mouth two (2) times a day. cyclobenzaprine 10 mg tablet Commonly known as:  FLEXERIL Take  by mouth three (3) times daily as needed for Muscle Spasm(s). * methocarbamol 750 mg tablet Commonly known as:  [id number]-559 Take 1 Tab by mouth three (3) times daily. * methocarbamol 750 mg tablet Commonly known as:  ROBAXIN Take 1 Tab by mouth four (4) times daily. Indications: MUSCLE SPASM NexIUM 40 mg capsule Generic drug:  esomeprazole Take  by mouth daily. predniSONE 10 mg tablet Commonly known as:  DELTASONE  
2 pills in am for 3 days, then 1 pill in am for 3 days and 1/2 pill in am for remainder  
  
 simvastatin 40 mg tablet Commonly known as:  ZOCOR Take 40 mg by mouth nightly. traMADol 50 mg tablet Commonly known as:  ULTRAM  
1 po bid prn severe pain  Indications: NEUROPATHIC PAIN, Pain VITAMIN D3 2,000 unit Tab Generic drug:  cholecalciferol (vitamin D3) Take  by mouth. * Notice: This list has 2 medication(s) that are the same as other medications prescribed for you. Read the directions carefully, and ask your doctor or other care provider to review them with you. Prescriptions Printed Refills  
 traMADol (ULTRAM) 50 mg tablet 0 Si po bid prn severe pain  Indications: NEUROPATHIC PAIN, Pain Class: Print Prescriptions Sent to Pharmacy  Refills  
 methocarbamol (ROBAXIN) 750 mg tablet 1  
 Sig: Take 1 Tab by mouth four (4) times daily. Indications: MUSCLE SPASM Class: Normal  
 Pharmacy: [facility] [id number] Ph #: [phone] Route: Oral  
 predniSONE (DELTASONE) 10 mg tablet 0 Si pills in am for 3 days, then 1 pill in am for 3 days and 1/2 pill in am for remainder Class: Normal  
 Pharmacy: [place] [id number] Ph #: [phone] Follow-up Instructions Return in about 1 month (around 2017) for Medication follow up. Patient Instructions Low Back Arthritis: Exercises Your Care Instructions Here are some examples of typical rehabilitation exercises for your condition. Start each exercise slowly. Ease off the exercise if you start to have pain. Your doctor or physical therapist will tell you when you can start these exercises and which ones will work best for you. When you are not being active, find a comfortable position for rest. Some people are comfortable on the floor or a medium-firm bed with a small pillow under their head and another under their knees. Some people prefer to lie on their side with a pillow between their knees. Don't stay in one position for too long. Take short walks (10 to 20 minutes) every 2 to 3 hours. Avoid slopes, hills, and stairs until you feel better. Walk only distances you can manage without pain, especially leg pain. How to do the exercises Pelvic tilt 1. Lie on your back with your knees bent. 2. \"Brace\" your stomachtighten your muscles by pulling in and imagining your belly button moving toward your spine. 3. Press your lower back into the floor. You should feel your hips and pelvis rock back. 4. Hold for 6 seconds while breathing smoothly. 5. Relax and allow your pelvis and hips to rock forward. 6. Repeat 8 to 12 times. Back stretches 1. Get down on your hands and knees on the floor. 2. Relax your head and allow it to droop. Round your back up toward the ceiling until you feel a nice stretch in your upper, middle, and lower back. Hold this stretch for as long as it feels comfortable, or about 15 to 30 seconds. 3. Return to the starting position with a flat back while you are on your hands and knees. 4. Let your back sway by pressing your stomach toward the floor. Lift your buttocks toward the ceiling. 5. Hold this position for 15 to 30 seconds. 6. Repeat 2 to 4 times. Follow-up care is a key part of your treatment and safety. Be sure to make and go to all appointments, and call your doctor if you are having problems. It's also a good idea to know your test results and keep a list of the medicines you take. Where can you learn more? Go to http://wilson-josue.info/. Enter C501 in the search box to learn more about \"Low Back Arthritis: Exercises. \" Current as of: May 23, 2016 Content Version: 11.1 © 4219-5333 [employer]. Care instructions adapted under license by [employer] (which disclaims liability or warranty for this information). If you have questions about a medical condition or this instruction, always ask your healthcare professional. [address] any warranty or liability for your use of this information. Introducing [facility]! [facility] introduces [address] patient portal. Now you can access parts of your medical record, email your doctor's office, and request medication refills online. 1. In your internet browser, go to [email or web address] 2. Click on the First Time User? Click Here link in the Sign In box. You will see the New Member Sign Up page. 3. Enter your [address] Access Code exactly as it appears below. You will not need to use this code after youve completed the sign-up process. If you do not sign up before the expiration date, you must request a new code. · [employer] Access Code: [email or web address] Expires: 6/4/2017  2:57 PM 
 
4. Enter the last four digits of your Social Security Number (xxxx) and Date of Birth (mm/dd/yyyy) as indicated and click Submit. You will be taken to the next sign-up page. 5. Create a [employer] ID. This will be your [employer] login ID and cannot be changed, so think of one that is secure and easy to remember. 6. Create a [employer] password. You can change your password at any time. 7. Enter your Password Reset Question and Answer. This can be used at a later time if you forget your password. 8. Enter your e-mail address. You will receive e-mail notification when new information is available in [address] 9. Click Sign Up. You can now view and download portions of your medical record. 10. Click the Download Summary menu link to download a portable copy of your medical information. If you have questions, please visit the Frequently Asked Questions section of the [employer] website. Remember, [employer] is NOT to be used for urgent needs. For medical emergencies, dial 911. Now available from your iPhone and Android! Please provide this summary of care documentation to your next provider. Your primary care clinician is listed as Charito Reddy. If you have any questions after today's visit, please call [phone].

## 2017-04-20 ENCOUNTER — OFFICE VISIT (OUTPATIENT)
Dept: ORTHOPEDIC SURGERY | Age: 57
End: 2017-04-20

## 2017-04-20 VITALS
BODY MASS INDEX: 37.25 KG/M2 | HEART RATE: 53 BPM | DIASTOLIC BLOOD PRESSURE: 72 MMHG | WEIGHT: 202.4 LBS | OXYGEN SATURATION: 100 % | HEIGHT: 62 IN | SYSTOLIC BLOOD PRESSURE: 137 MMHG | TEMPERATURE: 98 F | RESPIRATION RATE: 18 BRPM

## 2017-04-20 DIAGNOSIS — M62.838 MUSCLE SPASM: ICD-10-CM

## 2017-04-20 DIAGNOSIS — Z98.1 S/P CERVICAL SPINAL FUSION: ICD-10-CM

## 2017-04-20 DIAGNOSIS — M54.50 ACUTE MIDLINE LOW BACK PAIN WITHOUT SCIATICA: ICD-10-CM

## 2017-04-20 DIAGNOSIS — N62 MACROMASTIA: ICD-10-CM

## 2017-04-20 DIAGNOSIS — M79.18 MYOFASCIAL PAIN: ICD-10-CM

## 2017-04-20 DIAGNOSIS — M79.2 NEURITIS: Primary | ICD-10-CM

## 2017-04-20 DIAGNOSIS — M48.02 CERVICAL SPINAL STENOSIS: ICD-10-CM

## 2017-04-20 RX ORDER — DIAZEPAM 5 MG/1
TABLET ORAL
Qty: 2 TAB | Refills: 0 | Status: SHIPPED | OUTPATIENT
Start: 2017-04-20 | End: 2017-05-24 | Stop reason: ALTCHOICE

## 2017-04-20 RX ORDER — TRAMADOL HYDROCHLORIDE 50 MG/1
TABLET ORAL
Qty: 30 TAB | Refills: 0 | Status: SHIPPED | OUTPATIENT
Start: 2017-04-20 | End: 2017-05-24 | Stop reason: SDUPTHER

## 2017-04-20 NOTE — PATIENT INSTRUCTIONS
[employer] Activation    Thank you for requesting access to [employer]. Please follow the instructions below to securely access and download your online medical record. [employer] allows you to send messages to your doctor, view your test results, renew your prescriptions, schedule appointments, and more. How Do I Sign Up? 1. In your internet browser, go to [email or web address]  2. Click on the First Time User? Click Here link in the Sign In box. You will be redirect to the New Member Sign Up page. 3. Enter your [employer] Access Code exactly as it appears below. You will not need to use this code after youve completed the sign-up process. If you do not sign up before the expiration date, you must request a new code. [employer] Access Code: [email or web address]  Expires: 2017  3:57 PM (This is the date your [employer] access code will )    4. Enter the last four digits of your Social Security Number (xxxx) and Date of Birth (mm/dd/yyyy) as indicated and click Submit. You will be taken to the next sign-up page. 5. Create a [employer] ID. This will be your [employer] login ID and cannot be changed, so think of one that is secure and easy to remember. 6. Create a [employer] password. You can change your password at any time. 7. Enter your Password Reset Question and Answer. This can be used at a later time if you forget your password. 8. Enter your e-mail address. You will receive e-mail notification when new information is available in [address] 9. Click Sign Up. You can now view and download portions of your medical record. 10. Click the Download Summary menu link to download a portable copy of your medical information. Additional Information    If you have questions, please visit the Frequently Asked Questions section of the [employer] website at [email or web address] [email or web address]/. Remember, [employer] is NOT to be used for urgent needs. For medical emergencies, dial 911.          Neck Arthritis: Exercises  Your Care Instructions  Here are some examples of typical rehabilitation exercises for your condition. Start each exercise slowly. Ease off the exercise if you start to have pain. Your doctor or physical therapist will tell you when you can start these exercises and which ones will work best for you. How to do the exercises  Neck stretches to the side    1. This stretch works best if you keep your shoulder down as you lean away from it. To help you remember to do this, start by relaxing your shoulders and lightly holding on to your thighs or your chair. 2. Tilt your head toward your shoulder and hold for 15 to 30 seconds. Let the weight of your head stretch your muscles. 3. Repeat 2 to 4 times toward each shoulder. Chin tuck    1. Lie on the floor with a rolled-up towel under your neck. Your head should be touching the floor. 2. Slowly bring your chin toward your chest.  3. Hold for a count of 6, and then relax for up to 10 seconds. 4. Repeat 8 to 12 times. Active cervical rotation    1. Sit in a firm chair, or stand up straight. 2. Keeping your chin level, turn your head to the right, and hold for 15 to 30 seconds. 3. Turn your head to the left and hold for 15 to 30 seconds. 4. Repeat 2 to 4 times to each side. Shoulder blade squeeze    1. While standing, squeeze your shoulder blades together. 2. Do not raise your shoulders up as you are squeezing. 3. Hold for 6 seconds. 4. Repeat 8 to 12 times. Shoulder rolls    1. Sit comfortably with your feet shoulder-width apart. You can also do this exercise standing up. 2. Roll your shoulders up, then back, and then down in a smooth, circular motion. 3. Repeat 2 to 4 times. Follow-up care is a key part of your treatment and safety. Be sure to make and go to all appointments, and call your doctor if you are having problems. It's also a good idea to know your test results and keep a list of the medicines you take. Where can you learn more?   Go to http://wilson-josue.info/. Enter A168 in the search box to learn more about \"Neck Arthritis: Exercises. \"  Current as of: May 23, 2016  Content Version: 11.2  © 6811-6722 [employer]. Care instructions adapted under license by [employer] (which disclaims liability or warranty for this information). If you have questions about a medical condition or this instruction, always ask your healthcare professional. [place] [address] any warranty or liability for your use of this information.

## 2017-04-20 NOTE — MR AVS SNAPSHOT
Visit Information Date & Time Provider Department Dept. Phone Encounter #  
 4/20/2017  8:45 AM Elian Bravo MD [facility]  Follow-up Instructions Return in about 1 month (around 5/20/2017) for Diagnostic Test follow up, Medication follow up. Upcoming Health Maintenance Date Due Hepatitis C Screening 1960 DTaP/Tdap/Td series (1 - Tdap) 7/10/1979 PAP AKA CERVICAL CYTOLOGY 7/10/1979 FOBT Q 1 YEAR AGE 50-75 7/10/2008 INFLUENZA AGE 9 TO ADULT 8/1/2016 BREAST CANCER SCRN MAMMOGRAM 1/4/2019 Allergies as of 4/20/2017  Review Complete On: 4/20/2017 By: Elian Bravo MD  
  
 Severity Noted Reaction Type Reactions Ampicillin  08/26/2011    Rash Codeine  08/26/2011    Itching Pcn [Penicillins]  08/26/2011    Rash  
 Sulfa (Sulfonamide Antibiotics)  08/26/2011    Itching Vicodin [Hydrocodone-acetaminophen]  02/25/2013   Side Effect Itching Current Immunizations  Never Reviewed No immunizations on file. Not reviewed this visit You Were Diagnosed With   
  
 Codes Comments Neuritis    -  Primary ICD-10-CM: M79.2 ICD-9-CM: 729.2 Myofascial pain     ICD-10-CM: M79.1 ICD-9-CM: 729.1 Muscle spasm     ICD-10-CM: D92.427 ICD-9-CM: 728.85 Macromastia     ICD-10-CM: N62 
ICD-9-CM: 611.1 Acute midline low back pain without sciatica     ICD-10-CM: M54.5 ICD-9-CM: 724.2 Vitals BP Pulse Temp Resp Height(growth percentile) Weight(growth percentile)  
 137/72 (!) 53 98 °F (36.7 °C) (Oral) 18 5' 2\" (1.575 m) 202 lb 6.4 oz (91.8 kg) SpO2 BMI OB Status Smoking Status 100% 37.02 kg/m2 Hysterectomy Former Smoker BMI and BSA Data Body Mass Index Body Surface Area 37.02 kg/m 2 2 m 2 Preferred Pharmacy Pharmacy Name Phone [facility] [address] [phone] Your Updated Medication List  
  
   
This list is accurate as of: 17 10:06 AM.  Always use your most recent med list. amLODIPine-benazepril 5-40 mg per capsule Commonly known as:  Marthann  Take 1 Cap by mouth daily. aspirin 81 mg tablet Take 81 mg by mouth daily. CALTRATE 600+D PLUS MINERALS 600 mg calcium- 400 unit Tab Generic drug:  Calcium Carbonate-Vit D3-Min Take  by mouth two (2) times a day. diazePAM 5 mg tablet Commonly known as:  VALIUM Take 1 po 30 minutes before the MRI and may repeat  Indications: anxiety, SEDATION  
  
 methocarbamol 750 mg tablet Commonly known as:  ROBAXIN Take 1 Tab by mouth four (4) times daily. Indications: MUSCLE SPASM NexIUM 40 mg capsule Generic drug:  esomeprazole Take  by mouth daily. predniSONE 10 mg tablet Commonly known as:  DELTASONE  
2 pills in am for 3 days, then 1 pill in am for 3 days and 1/2 pill in am for remainder  
  
 simvastatin 40 mg tablet Commonly known as:  ZOCOR Take 40 mg by mouth nightly. traMADol 50 mg tablet Commonly known as:  ULTRAM  
1 po bid prn severe pain  Indications: NEUROPATHIC PAIN, Pain VITAMIN D3 2,000 unit Tab Generic drug:  cholecalciferol (vitamin D3) Take  by mouth. Prescriptions Printed Refills  
 traMADol (ULTRAM) 50 mg tablet 0 Si po bid prn severe pain  Indications: NEUROPATHIC PAIN, Pain Class: Print  
 diazePAM (VALIUM) 5 mg tablet 0 Sig: Take 1 po 30 minutes before the MRI and may repeat  Indications: anxiety, SEDATION Class: Print Follow-up Instructions Return in about 1 month (around 2017) for Diagnostic Test follow up, Medication follow up. To-Do List   
 2017 Imaging:  MRI CERV SPINE WO CONT Referral Information Referral ID Referred By Referred To  
  
 [id number] Polina Sweeney Not Available Visits Status Start Date End Date 1 New Request 17 If your referral has a status of pending review or denied, additional information will be sent to support the outcome of this decision. Patient Instructions MyChart Activation Thank you for requesting access to [employer]. Please follow the instructions below to securely access and download your online medical record. [employer] allows you to send messages to your doctor, view your test results, renew your prescriptions, schedule appointments, and more. How Do I Sign Up? 1. In your internet browser, go to [email or web address] 
2. Click on the First Time User? Click Here link in the Sign In box. You will be redirect to the New Member Sign Up page. 3. Enter your [employer] Access Code exactly as it appears below. You will not need to use this code after youve completed the sign-up process. If you do not sign up before the expiration date, you must request a new code. [employer] Access Code: [email or web address] Expires: 2017  3:57 PM (This is the date your [employer] access code will ) 4. Enter the last four digits of your Social Security Number (xxxx) and Date of Birth (mm/dd/yyyy) as indicated and click Submit. You will be taken to the next sign-up page. 5. Create a [facility] ID. This will be your [employer] login ID and cannot be changed, so think of one that is secure and easy to remember. 6. Create a [employer] password. You can change your password at any time. 7. Enter your Password Reset Question and Answer. This can be used at a later time if you forget your password. 8. Enter your e-mail address. You will receive e-mail notification when new information is available in [address] 9. Click Sign Up. You can now view and download portions of your medical record. 10. Click the Download Summary menu link to download a portable copy of your medical information. Additional Information If you have questions, please visit the Frequently Asked Questions section of the [employer] website at [email or web address] Remember, [employer] is NOT to be used for urgent needs. For medical emergencies, dial 911. Neck Arthritis: Exercises Your Care Instructions Here are some examples of typical rehabilitation exercises for your condition. Start each exercise slowly. Ease off the exercise if you start to have pain. Your doctor or physical therapist will tell you when you can start these exercises and which ones will work best for you. How to do the exercises Neck stretches to the side 1. This stretch works best if you keep your shoulder down as you lean away from it. To help you remember to do this, start by relaxing your shoulders and lightly holding on to your thighs or your chair. 2. Tilt your head toward your shoulder and hold for 15 to 30 seconds. Let the weight of your head stretch your muscles. 3. Repeat 2 to 4 times toward each shoulder. Chin tuck 1. Lie on the floor with a rolled-up towel under your neck. Your head should be touching the floor. 2. Slowly bring your chin toward your chest. 
3. Hold for a count of 6, and then relax for up to 10 seconds. 4. Repeat 8 to 12 times. Active cervical rotation 1. Sit in a firm chair, or stand up straight. 2. Keeping your chin level, turn your head to the right, and hold for 15 to 30 seconds. 3. Turn your head to the left and hold for 15 to 30 seconds. 4. Repeat 2 to 4 times to each side. Shoulder blade squeeze 1. While standing, squeeze your shoulder blades together. 2. Do not raise your shoulders up as you are squeezing. 3. Hold for 6 seconds. 4. Repeat 8 to 12 times. Shoulder rolls 1. Sit comfortably with your feet shoulder-width apart. You can also do this exercise standing up. 2. Roll your shoulders up, then back, and then down in a smooth, circular motion. 3. Repeat 2 to 4 times. Follow-up care is a key part of your treatment and safety.  Be sure to make and go to all appointments, and call your doctor if you are having problems. It's also a good idea to know your test results and keep a list of the medicines you take. Where can you learn more? Go to http://wilson-josue.info/. Enter J252 in the search box to learn more about \"Neck Arthritis: Exercises. \" Current as of: May 23, 2016 Content Version: 11.2 © 6310-8848 [employer]. Care instructions adapted under license by [employer] (which disclaims liability or warranty for this information). If you have questions about a medical condition or this instruction, always ask your healthcare professional. [address] any warranty or liability for your use of this information. Introducing [facility] HE[facility]! [facility] introduces [employer] patient portal. Now you can access parts of your medical record, email your doctor's office, and request medication refills online. 1. In your internet browser, go to [email or web address] 2. Click on the First Time User? Click Here link in the Sign In box. You will see the New Member Sign Up page. 3. Enter your [employer] Access Code exactly as it appears below. You will not need to use this code after youve completed the sign-up process. If you do not sign up before the expiration date, you must request a new code. · [employer] Access Code: [email or web address] Expires: 6/4/2017  3:57 PM 
 
4. Enter the last four digits of your Social Security Number (xxxx) and Date of Birth (mm/dd/yyyy) as indicated and click Submit. You will be taken to the next sign-up page. 5. Create a [employer] ID. This will be your [employer] login ID and cannot be changed, so think of one that is secure and easy to remember. 6. Create a [employer] password. You can change your password at any time. 7. Enter your Password Reset Question and Answer. This can be used at a later time if you forget your password. 8. Enter your e-mail address. You will receive e-mail notification when new information is available in [address] 9. Click Sign Up. You can now view and download portions of your medical record. 10. Click the Download Summary menu link to download a portable copy of your medical information. If you have questions, please visit the Frequently Asked Questions section of the [employer] website. Remember, [employer] is NOT to be used for urgent needs. For medical emergencies, dial 911. Now available from your iPhone and Android! Please provide this summary of care documentation to your next provider. Your primary care clinician is listed as Charito Reddy. If you have any questions after today's visit, please call [phone].

## 2017-04-20 NOTE — PROGRESS NOTES
[facility]  [address] [place], [facility] [address]  Phone: [phone]  Fax: [phone]      ASSESSMENT   Raquel Cooks was seen today for back pain and neck pain. Diagnoses and all orders for this visit:    Neuritis  -     MRI CERV SPINE WO CONT; Future    Cervical spinal stenosis    Myofascial pain  -     MRI CERV SPINE WO CONT; Future    Muscle spasm  -     traMADol (ULTRAM) 50 mg tablet; 1 po bid prn severe pain  Indications: NEUROPATHIC PAIN, Pain  -     MRI CERV SPINE WO CONT; Future    Macromastia  -     MRI CERV SPINE WO CONT; Future    Acute midline low back pain without sciatica  -     traMADol (ULTRAM) 50 mg tablet; 1 po bid prn severe pain  Indications: NEUROPATHIC PAIN, Pain  -     MRI CERV SPINE WO CONT; Future    S/P cervical spinal fusion    Other orders  -     diazePAM (VALIUM) 5 mg tablet; Take 1 po 30 minutes before the MRI and may repeat  Indications: anxiety, SEDATION         IMPRESSION AND PLAN:  Yoel Augustin is a 60 y.o. female with history of cervical and lumbar pain. Pt c/o pain in the neck and upper back and frequent muscle spasms. She states that she gained weight after she quit smoking and currently wears 42 DD bra. Pt takes Ultram 50 mg and Flexeril 10 mg and denies any sedation with the medications. 1) Pt was given information on cervical arthritis exercises. 2) An open cervical MRI was ordered. 3) Pt was offered physical therapy but declined. 4) She received a refill of Ultram 50 mg 1 tab BID prn severe pain. 5) Ms. Bessie Belle has a reminder for a \"due or due soon\" health maintenance. I have asked that she contact her primary care provider, Liza Vazquez MD, for follow-up on this health maintenance. 6)  reviewed. 7) Pt will follow-up in 2-3 weeks. HISTORY OF PRESENT ILLNESS:  Yoel Augustin is a 60 y.o. female with history of cervical and lumbar pain.  Pt c/o pain in the neck and upper back. She c/o frequent muscle spasms. Pt admits to occasional numbness in the hands and feet at night. She admits that she has always experienced issues with balance. Pt has been using ice with benefit. She states that she gained weight after she quit smoking and currently wears 42 DD bra. Of note, Pt had a prior cervical fusion with Dr. Jamin West about 20 years ago. She takes Ultram 50 mg and Flexeril 10 mg and denies any sedation with the medications. Pt reports that she recently started a prednisone taper and is taking 1/2 tab daily at this time. Pt at this time desires to proceed with a lumbar MRI. Pain Scale: /10    PCP: Aaron Nguyen MD       Past Medical History:   Diagnosis Date    Cellulitis of breast, left     Degenerative cervical disc     Dyslipidemia     Goiter     History of echocardiogram 12/16/2011    EF 65-70%. RVSP 30-35. No significant valvular heart disease.  Hypercholesterolemia     Hypertension     Hypertension     Left leg pain     Lower extremity peripheral arterial testing 01/30/2012    No peripheral arterial disease bilaterally at rest.  Exercise deferred. R IVORY 1.06.  L IVORY 1.06.    Normal nuclear stress test 08/11/2005    No ischemia or prior infarction. EF 76%. No reg'l WMA. Neg EKG on max EST. Ex time 7 mins.  Thyroid disease         Social History     Social History    Marital status: SINGLE     Spouse name: N/A    Number of children: N/A    Years of education: N/A     Occupational History    Not on file.      Social History Main Topics    Smoking status: Former Smoker     Packs/day: 0.50     Years: 15.00     Quit date: 12/16/2010    Smokeless tobacco: Never Used    Alcohol use Yes      Comment: occasionally    Drug use: No    Sexual activity: Not on file     Other Topics Concern    Not on file     Social History Narrative       Current Outpatient Prescriptions   Medication Sig Dispense Refill    traMADol (ULTRAM) 50 mg tablet 1 po bid prn severe pain  Indications: NEUROPATHIC PAIN, Pain 30 Tab 0    diazePAM (VALIUM) 5 mg tablet Take 1 po 30 minutes before the MRI and may repeat  Indications: anxiety, SEDATION 2 Tab 0    methocarbamol (ROBAXIN) 750 mg tablet Take 1 Tab by mouth four (4) times daily. Indications: MUSCLE SPASM 60 Tab 1    predniSONE (DELTASONE) 10 mg tablet 2 pills in am for 3 days, then 1 pill in am for 3 days and 1/2 pill in am for remainder 11 Tab 0    cholecalciferol, vitamin D3, (VITAMIN D3) 2,000 unit tab Take  by mouth.  esomeprazole (NEXIUM) 40 mg capsule Take  by mouth daily.  amLODIPine-benazepril (LOTREL) 5-40 mg per capsule Take 1 Cap by mouth daily.  aspirin 81 mg tablet Take 81 mg by mouth daily.  Calcium Carbonate-Vit D3-Min (CALTRATE 600+D PLUS MINERALS) 600 mg calcium- 400 unit Tab Take  by mouth two (2) times a day.  simvastatin (ZOCOR) 40 mg tablet Take 40 mg by mouth nightly. Allergies   Allergen Reactions    Ampicillin Rash    Codeine Itching    Pcn [Penicillins] Rash    Sulfa (Sulfonamide Antibiotics) Itching    Vicodin [Hydrocodone-Acetaminophen] Itching         REVIEW OF SYSTEMS    Constitutional: Negative for fever, chills, or weight change. Respiratory: Negative for cough or shortness of breath. Cardiovascular: Negative for chest pain or palpitations. Gastrointestinal: Negative for acid reflux, change in bowel habits, or constipation. Genitourinary: Negative for dysuria and flank pain. Musculoskeletal: Positive for cervical and lumbar pain. Skin: Negative for rash. Neurological: Positive for occasional numbness in the hands and feet. Negative for headaches or dizziness. Endo/Heme/Allergies: Negative for increased bruising. Psychiatric/Behavioral: Negative for difficulty with sleep.       PHYSICAL EXAMINATION  Visit Vitals    /72    Pulse (!) 53    Temp 98 °F (36.7 °C) (Oral)    Resp 18    Ht 5' 2\" (1.575 m)    Wt 202 lb 6.4 oz (91.8 kg)    SpO2 100%    BMI 37.02 kg/m2       Constitutional: Awake, alert, and in no acute distress  Neurological: 1+ symmetrical DTRs in the upper extremities. 1+ symmetrical DTRs in the lower extremities. Sensation to light touch is intact. Slightly positive Nick's sign on the right. Skin: warm, dry, and intact. Musculoskeletal: Very tight across the upper trapezius with multiple trigger points, R>L. Slightly decreased ROM in the cervical spine. No pain with extension, axial loading, or forward flexion. No pain with internal or external rotation of her hips. Negative straight leg raise bilaterally. Biceps  Triceps Deltoids Wrist Ext Wrist Flex Hand Intrin   Right +4/5 +4/5 +4/5 +4/5 +4/5 +4/5   Left +4/5 +4/5 +4/5 +4/5 +4/5 +4/5      Hip Flex  Quads Hamstrings Ankle DF EHL Ankle PF   Right +4/5 +4/5 +4/5 +4/5 +4/5 +4/5   Left +4/5 +4/5 +4/5 +4/5 +4/5 +4/5     IMAGING:    Cervical spine MRI from 06/24/2015 was personally reviewed with the Pt and demonstrated:    Results from Hospital Encounter encounter on 06/24/15   MRI CERV SPINE WO CONT   Narrative MRI Of Cervical Spine Without Contrast    CPT CODE: 39769    HISTORY: Increased left upper extremity radiculopathy and weakness, C6 and C7  distribution. COMPARISON: None. TECHNIQUE: Sagittal T1, T2, STIR; axial T2 and axial T2 medic. FINDINGS:     Status post C5-6 anterior cervical interbody fusion. Normal alignment and  vertebral body heights maintained. No pathologic marrow signal is seen. No cord  signal abnormality. Visualized posterior fossa contents are unremarkable. Paravertebral space is unremarkable. Partially imaged heterogeneous T2  hyperintense left paratracheal mass measuring roughly 2 cm may represent a  thyroid nodule. Spinal canal and neural foramen:     C2-3: Tiny central disc protrusion without spinal stenosis. C3-4: Small central disc protrusion causing mild spinal stenosis and minimally  indenting the ventral cord.  No foraminal stenosis. C4-5: Nonfocal disc protrusion with marginal osteophytic bar and uncovertebral  spurring. Bilateral facet hypertrophy. Mild to moderate spinal canal stenosis. Moderate bilateral foraminal stenoses. C5-6: Status post fusion with no spinal canal or foraminal stenosis. C6-7: Moderately sized central disc extrusion causing mild to moderate spinal  stenosis and mildly flattening the ventral cord. Uncovertebral spurring more  prominent on the left causing moderate foraminal stenosis. C7-T1: No significant disc disease or spinal stenosis. Impression IMPRESSION:    Central disc protrusion causing mild spinal stenosis at C3-4. Nonfocal disc protrusion and marginal osteophyte at C4-5 causing mild to  moderate spinal canal and moderate bilateral foraminal stenoses. Moderately sized disc extrusion at C6-7 causing mild to moderate spinal stenosis  and uncovertebral spurring causing moderate left foraminal stenosis. C5-6 ACDF with no spinal stenosis at this level. Lumbar Spine 4V X-rays from 01/23/2017 were personally reviewed with the Pt and demonstrated:  1) Multilevel degenerative facets. 2) Mild degenerative disc at L5-S1. 3) Calcification aorta. 4) Good alignment    Written by Leonor Medrano, as dictated by Masoud Shaffer MD.  I, Dr. Masoud Shaffer confirm that all documentation is accurate.

## 2017-04-28 ENCOUNTER — HOSPITAL ENCOUNTER (OUTPATIENT)
Age: 57
Discharge: HOME | End: 2017-04-28
Attending: PHYSICAL MEDICINE & REHABILITATION
Payer: COMMERCIAL

## 2017-04-28 DIAGNOSIS — M79.2 NEURITIS: ICD-10-CM

## 2017-04-28 DIAGNOSIS — M54.50 ACUTE MIDLINE LOW BACK PAIN WITHOUT SCIATICA: ICD-10-CM

## 2017-04-28 DIAGNOSIS — N62 MACROMASTIA: ICD-10-CM

## 2017-04-28 DIAGNOSIS — M62.838 MUSCLE SPASM: ICD-10-CM

## 2017-04-28 DIAGNOSIS — M79.18 MYOFASCIAL PAIN: ICD-10-CM

## 2017-04-28 PROCEDURE — 72141 MRI NECK SPINE W/O DYE: CPT

## 2017-05-24 ENCOUNTER — OFFICE VISIT (OUTPATIENT)
Dept: ORTHOPEDIC SURGERY | Age: 57
End: 2017-05-24

## 2017-05-24 VITALS
RESPIRATION RATE: 18 BRPM | BODY MASS INDEX: 37.17 KG/M2 | SYSTOLIC BLOOD PRESSURE: 139 MMHG | DIASTOLIC BLOOD PRESSURE: 73 MMHG | WEIGHT: 202 LBS | TEMPERATURE: 97.7 F | HEART RATE: 58 BPM | HEIGHT: 62 IN

## 2017-05-24 DIAGNOSIS — M62.830 MUSCLE SPASM OF BACK: ICD-10-CM

## 2017-05-24 DIAGNOSIS — G89.29 CHRONIC MIDLINE LOW BACK PAIN WITHOUT SCIATICA: ICD-10-CM

## 2017-05-24 DIAGNOSIS — M54.50 CHRONIC MIDLINE LOW BACK PAIN WITHOUT SCIATICA: ICD-10-CM

## 2017-05-24 DIAGNOSIS — M62.838 MUSCLE SPASM: ICD-10-CM

## 2017-05-24 DIAGNOSIS — M54.50 ACUTE MIDLINE LOW BACK PAIN WITHOUT SCIATICA: ICD-10-CM

## 2017-05-24 DIAGNOSIS — M79.18 MYOFASCIAL PAIN: ICD-10-CM

## 2017-05-24 DIAGNOSIS — M48.02 FORAMINAL STENOSIS OF CERVICAL REGION: Primary | ICD-10-CM

## 2017-05-24 DIAGNOSIS — M48.02 FORAMINAL STENOSIS OF CERVICAL REGION: ICD-10-CM

## 2017-05-24 RX ORDER — TRAMADOL HYDROCHLORIDE 50 MG/1
TABLET ORAL
Qty: 60 TAB | Refills: 0 | Status: SHIPPED | OUTPATIENT
Start: 2017-05-24 | End: 2019-05-02 | Stop reason: ALTCHOICE

## 2017-05-24 NOTE — PROGRESS NOTES
[facility]  [address], [facility] [address]  Phone: [phone]  Fax: [phone]      ENRIQUE Smith was seen today for neck pain and back pain. Diagnoses and all orders for this visit:    Foraminal stenosis of cervical region  -     DRUG SCREEN UR - W/ CONFIRM; Future    Myofascial pain  -     DRUG SCREEN UR - W/ CONFIRM; Future    Chronic midline low back pain without sciatica  -     DRUG SCREEN UR - W/ CONFIRM; Future    Muscle spasm of back  -     DRUG SCREEN UR - W/ CONFIRM; Future    Acute midline low back pain without sciatica  -     traMADol (ULTRAM) 50 mg tablet; 1 po bid prn severe pain  Indications: NEUROPATHIC PAIN, Pain  -     DRUG SCREEN UR - W/ CONFIRM; Future    Muscle spasm  -     traMADol (ULTRAM) 50 mg tablet; 1 po bid prn severe pain  Indications: NEUROPATHIC PAIN, Pain  -     DRUG SCREEN UR - W/ CONFIRM; Future         IMPRESSION AND PLAN:  Alexander Valerio is a 60 y.o. female with history of chronic cervical and lumbar pain. She presents to the office today for cervical MRI follow up. She continues to experience intermittent right sided neck pain. Of note, Pt had a prior C5-6 fusion. She c/o pain across the lower back but admits to experiencing significant relief when taking Ultram 50 mg. Of note, Pt takes Ultram 50 mg 1 tab as her pain warrants. Pt does not take the Ultram 50 mg daily but admits that shew occasionally takes 2 tabs Ultram 50 mg daily as needed. She has not tried physical therapy in the past but admits that she cannot afford it at this time. Pt at this time desires to continue with current care. 1) Pt was given information on cervical and lumbar arthritis exercises. 2) She received a refill of Ultram 50 mg 1 tab BID prn severe pain. 3) A UDS was obtained and the Pt signed a pain agreement. 4) Pt declined physical therapy due to financial strain. 5) I recommended the Pt lose weight.    6) I encouraged the Pt to try water exercise, gurpreet chi, and chair yoga. 7) I recommended the Pt exercise regularly 30 minutes a day, 5 days a week. 8) Ms. Garry Bruno has a reminder for a \"due or due soon\" health maintenance. I have asked that she contact her primary care provider, Sherly James MD, for follow-up on this health maintenance. 9)  demonstrated consistency with prescribing. 10) Pt will follow-up in 2 months. HISTORY OF PRESENT ILLNESS:  Ivett Alaniz is a 60 y.o. female with history of chronic cervical and lumbar pain. She presents to the office today for cervical MRI follow up. She continues to experience intermittent right sided neck pain. Of note, Pt had a prior C5-6 fusion. She c/o pain across the lower back but admits to experiencing significant relief when taking Ultram 50 mg. Of note, Pt takes Ultram 50 mg 1 tab as her pain warrants. Pt does not take the Ultram 50 mg daily but admits that shew occasionally takes 2 tabs Ultram 50 mg daily as needed. She has not tried physical therapy in the past but admits that she cannot afford it at this time. Pt at this time desires to continue with current care. Of note, Pt quit smoking 3 years ago. Pain Scale: 0 - No pain/10    PCP: Sherly James MD       Past Medical History:   Diagnosis Date    Cellulitis of breast, left     Degenerative cervical disc     Dyslipidemia     Goiter     History of echocardiogram 12/16/2011    EF 65-70%. RVSP 30-35. No significant valvular heart disease.  Hypercholesterolemia     Hypertension     Hypertension     Left leg pain     Lower extremity peripheral arterial testing 01/30/2012    No peripheral arterial disease bilaterally at rest.  Exercise deferred. R IVORY 1.06.  L IVORY 1.06.    Normal nuclear stress test 08/11/2005    No ischemia or prior infarction. EF 76%. No reg'l WMA. Neg EKG on max EST. Ex time 7 mins.     Thyroid disease         Social History     Social History    Marital status: SINGLE     Spouse name: N/A    Number of children: N/A    Years of education: N/A     Occupational History    Not on file. Social History Main Topics    Smoking status: Former Smoker     Packs/day: 0.50     Years: 15.00     Quit date: 12/16/2010    Smokeless tobacco: Never Used    Alcohol use Yes      Comment: occasionally    Drug use: No    Sexual activity: Not on file     Other Topics Concern    Not on file     Social History Narrative       Current Outpatient Prescriptions   Medication Sig Dispense Refill    traMADol (ULTRAM) 50 mg tablet 1 po bid prn severe pain  Indications: NEUROPATHIC PAIN, Pain 60 Tab 0    methocarbamol (ROBAXIN) 750 mg tablet Take 1 Tab by mouth four (4) times daily. Indications: MUSCLE SPASM 60 Tab 1    cholecalciferol, vitamin D3, (VITAMIN D3) 2,000 unit tab Take  by mouth.  esomeprazole (NEXIUM) 40 mg capsule Take  by mouth daily.  amLODIPine-benazepril (LOTREL) 5-40 mg per capsule Take 1 Cap by mouth daily.  aspirin 81 mg tablet Take 81 mg by mouth daily.  Calcium Carbonate-Vit D3-Min (CALTRATE 600+D PLUS MINERALS) 600 mg calcium- 400 unit Tab Take  by mouth two (2) times a day.  simvastatin (ZOCOR) 40 mg tablet Take 40 mg by mouth nightly. Allergies   Allergen Reactions    Ampicillin Rash    Codeine Itching    Pcn [Penicillins] Rash    Sulfa (Sulfonamide Antibiotics) Itching    Vicodin [Hydrocodone-Acetaminophen] Itching         REVIEW OF SYSTEMS    Constitutional: Negative for fever, chills, or weight change. Respiratory: Negative for cough or shortness of breath. Cardiovascular: Negative for chest pain or palpitations. Gastrointestinal: Negative for acid reflux, change in bowel habits, or constipation. Genitourinary: Negative for dysuria and flank pain. Musculoskeletal: Positive for cervical pain. Skin: Negative for rash.    Neurological: Negative for headaches, dizziness, or numbness. Endo/Heme/Allergies: Negative for increased bruising. Psychiatric/Behavioral: Negative for difficulty with sleep. PHYSICAL EXAMINATION  Visit Vitals    /73    Pulse (!) 58    Temp 97.7 °F (36.5 °C) (Oral)    Resp 18    Ht 5' 2\" (1.575 m)    Wt 202 lb (91.6 kg)    BMI 36.95 kg/m2       Constitutional: Awake, alert, and in no acute distress  Neurological: 1+ symmetrical DTRs in the upper extremities. 1+ symmetrical DTRs in the lower extremities. Sensation to light touch is intact. Negative Nick's sign bilaterally. Skin: warm, dry, and intact. Musculoskeletal: Decreased ROM with side to side cervical flexion. Tight across the upper trapezius. Tenderness to palpation in the lower lumbar region. Moderate pain with extension and axial loading. Biceps  Triceps Deltoids Wrist Ext Wrist Flex Hand Intrin   Right +4/5 +4/5 +4/5 +4/5 +4/5 +4/5   Left +4/5 +4/5 +4/5 +4/5 +4/5 +4/5      Hip Flex  Quads Hamstrings Ankle DF EHL Ankle PF   Right +4/5 +4/5 +4/5 +4/5 +4/5 +4/5   Left +4/5 +4/5 +4/5 +4/5 +4/5 +4/5     IMAGING:    Cervical spine MRI from 04/28/2017 was personally reviewed with the Pt and demonstrated:    Results from Hospital Encounter encounter on 04/28/17   MRI CERV SPINE WO CONT   Narrative EXAM:  MRI CERV SPINE WO CONT    INDICATION:   increase pain, weakness    COMPARISON: None. TECHNIQUE: MR imaging of the cervical spine was performed including sagittal T1,  T2, STIR;  axial GRE, T2, T1. Contrast was not administered. FINDINGS:  Straightening of the cervical spine. Susceptibility artifact from C5 to C6. Mild/moderate degenerative discogenic disease with endplate edema at M6-I0. Slight retrolisthesis of C4 on C5. Remainder vertebral bodies maintain normal  alignment. Mild degenerative discogenic disease C3-C4, moderate degenerative  discogenic disease at C6-C7. Vertebral body heights are maintained. Narrow  cervical spinal canal on a congenital basis .   The craniocervical junction is  intact. The course, caliber, and signal intensity of the spinal cord are  normal.      C2/3:  Mild disc bulge on the right. Mild facet arthropathy. Mild right  foraminal stenosis. Mild narrowing of the right central canal.    C3/4:  Small central disc protrusion with annular tear. Potential slight contact  with the ventral cervical cord. Mild facet arthropathy. Mild foraminal stenosis. Mild central canal stenosis, midline AP diameter is 7.8 mm.    C4/5:  Disc osteophyte complex and posterior ligamentous hypertrophy. Moderate  central canal stenosis, midline AP diameter is 6.3. Moderate left and moderate  to severe right foraminal stenosis. C5/6:  Level of fusion. Central canal and neural foramen are patent. C6/7:  Diffuse disc bulge with central disc extrusion. Disc material extends  slightly inferiorly. Moderate to severe left and moderate right foraminal  stenosis. Posterior ligamentous hypertrophy. Moderate central canal stenosis,  midline AP diameter is 6.6 mm.    C7/T1:  No significant central canal or foraminal stenosis. Impression IMPRESSION:      1. Anterior fusion of C5 and C6. Central canal and neural foramen are patent. 2. Degenerative changes at C4-C5 and C6-C7 moderately narrowing the central  canal and moderate to severely narrowing the neural foramen. Degenerative discogenic disease with endplate edema at R3-W1. Central disc extrusion at C6-C7. Lumbar Spine 4V X-rays from 01/23/2017 were personally reviewed with the Pt and demonstrated:  1) Multilevel degenerative facets. 2) Mild degenerative disc at L5-S1. 3) Calcification aorta. 4) Good alignment    Written by Lyla Tomas, as dictated by Franco Matos MD.  I, Dr. Franco Matos confirm that all documentation is accurate.

## 2017-05-24 NOTE — MR AVS SNAPSHOT
Visit Information Date & Time Provider Department Dept. Phone Encounter #  
 5/24/2017  8:50 AM Cris Estrada MD [facility] [phone] [id number] Follow-up Instructions Return in about 2 months (around 7/24/2017) for Medication follow up. Upcoming Health Maintenance Date Due Hepatitis C Screening 1960 DTaP/Tdap/Td series (1 - Tdap) 7/10/1979 PAP AKA CERVICAL CYTOLOGY 7/10/1979 FOBT Q 1 YEAR AGE 50-75 7/10/2008 INFLUENZA AGE 9 TO ADULT 8/1/2017 BREAST CANCER SCRN MAMMOGRAM 1/4/2019 Allergies as of 5/24/2017  Review Complete On: 5/24/2017 By: Cris Estrada MD  
  
 Severity Noted Reaction Type Reactions Ampicillin  08/26/2011    Rash Codeine  08/26/2011    Itching Pcn [Penicillins]  08/26/2011    Rash  
 Sulfa (Sulfonamide Antibiotics)  08/26/2011    Itching Vicodin [Hydrocodone-acetaminophen]  02/25/2013   Side Effect Itching Current Immunizations  Never Reviewed No immunizations on file. Not reviewed this visit You Were Diagnosed With   
  
 Codes Comments Foraminal stenosis of cervical region    -  Primary ICD-10-CM: M99.81 ICD-9-CM: 723.0 Myofascial pain     ICD-10-CM: M79.1 ICD-9-CM: 729.1 Chronic midline low back pain without sciatica     ICD-10-CM: M54.5, G89.29 ICD-9-CM: 724.2, 338.29 Muscle spasm of back     ICD-10-CM: Q69.872 ICD-9-CM: 724.8 Acute midline low back pain without sciatica     ICD-10-CM: M54.5 ICD-9-CM: 724.2 Muscle spasm     ICD-10-CM: Y88.521 ICD-9-CM: 728.85 Vitals BP Pulse Temp Resp Height(growth percentile) Weight(growth percentile) 139/73 (!) 58 97.7 °F (36.5 °C) (Oral) 18 5' 2\" (1.575 m) 202 lb (91.6 kg) BMI OB Status Smoking Status 36.95 kg/m2 Hysterectomy Former Smoker BMI and BSA Data Body Mass Index Body Surface Area  
 36.95 kg/m 2 2 m 2 Preferred Pharmacy Pharmacy Name Phone [facility] [address] [phone] Your Updated Medication List  
  
   
This list is accurate as of: 17 10:18 AM.  Always use your most recent med list. amLODIPine-benazepril 5-40 mg per capsule Commonly known as:  Olga Hof Take 1 Cap by mouth daily. aspirin 81 mg tablet Take 81 mg by mouth daily. CALTRATE 600+D PLUS MINERALS 600 mg calcium- 400 unit Tab Generic drug:  Calcium Carbonate-Vit D3-Min Take  by mouth two (2) times a day. methocarbamol 750 mg tablet Commonly known as:  ROBAXIN Take 1 Tab by mouth four (4) times daily. Indications: MUSCLE SPASM NexIUM 40 mg capsule Generic drug:  esomeprazole Take  by mouth daily. simvastatin 40 mg tablet Commonly known as:  ZOCOR Take 40 mg by mouth nightly. traMADol 50 mg tablet Commonly known as:  ULTRAM  
1 po bid prn severe pain  Indications: NEUROPATHIC PAIN, Pain VITAMIN D3 2,000 unit Tab Generic drug:  cholecalciferol (vitamin D3) Take  by mouth. Prescriptions Printed Refills  
 traMADol (ULTRAM) 50 mg tablet 0 Si po bid prn severe pain  Indications: NEUROPATHIC PAIN, Pain Class: Print Follow-up Instructions Return in about 2 months (around 2017) for Medication follow up. To-Do List   
 2017 Lab:  DRUG SCREEN UR - W/ CONFIRM Patient Instructions Neck Arthritis: Exercises Your Care Instructions Here are some examples of typical rehabilitation exercises for your condition. Start each exercise slowly. Ease off the exercise if you start to have pain. Your doctor or physical therapist will tell you when you can start these exercises and which ones will work best for you. How to do the exercises Neck stretches to the side 1.  This stretch works best if you keep your shoulder down as you lean away from it. To help you remember to do this, start by relaxing your shoulders and lightly holding on to your thighs or your chair. 2. Tilt your head toward your shoulder and hold for 15 to 30 seconds. Let the weight of your head stretch your muscles. 3. Repeat 2 to 4 times toward each shoulder. Chin tuck 1. Lie on the floor with a rolled-up towel under your neck. Your head should be touching the floor. 2. Slowly bring your chin toward your chest. 
3. Hold for a count of 6, and then relax for up to 10 seconds. 4. Repeat 8 to 12 times. Active cervical rotation 1. Sit in a firm chair, or stand up straight. 2. Keeping your chin level, turn your head to the right, and hold for 15 to 30 seconds. 3. Turn your head to the left and hold for 15 to 30 seconds. 4. Repeat 2 to 4 times to each side. Shoulder blade squeeze 1. While standing, squeeze your shoulder blades together. 2. Do not raise your shoulders up as you are squeezing. 3. Hold for 6 seconds. 4. Repeat 8 to 12 times. Shoulder rolls 1. Sit comfortably with your feet shoulder-width apart. You can also do this exercise standing up. 2. Roll your shoulders up, then back, and then down in a smooth, circular motion. 3. Repeat 2 to 4 times. Follow-up care is a key part of your treatment and safety. Be sure to make and go to all appointments, and call your doctor if you are having problems. It's also a good idea to know your test results and keep a list of the medicines you take. Where can you learn more? Go to http://wilson-josue.info/. Enter V758 in the search box to learn more about \"Neck Arthritis: Exercises. \" Current as of: May 23, 2016 Content Version: 11.2 © 5833-9020 [employer]. Care instructions adapted under license by [employer] (which disclaims liability or warranty for this information).  If you have questions about a medical condition or this instruction, always ask your healthcare professional. [place] [address] any warranty or liability for your use of this information. Low Back Arthritis: Exercises Your Care Instructions Here are some examples of typical rehabilitation exercises for your condition. Start each exercise slowly. Ease off the exercise if you start to have pain. Your doctor or physical therapist will tell you when you can start these exercises and which ones will work best for you. When you are not being active, find a comfortable position for rest. Some people are comfortable on the floor or a medium-firm bed with a small pillow under their head and another under their knees. Some people prefer to lie on their side with a pillow between their knees. Don't stay in one position for too long. Take short walks (10 to 20 minutes) every 2 to 3 hours. Avoid slopes, hills, and stairs until you feel better. Walk only distances you can manage without pain, especially leg pain. How to do the exercises Pelvic tilt 4. Lie on your back with your knees bent. 5. \"Brace\" your stomachtighten your muscles by pulling in and imagining your belly button moving toward your spine. 6. Press your lower back into the floor. You should feel your hips and pelvis rock back. 7. Hold for 6 seconds while breathing smoothly. 8. Relax and allow your pelvis and hips to rock forward. 9. Repeat 8 to 12 times. Back stretches 5. Get down on your hands and knees on the floor. 6. Relax your head and allow it to droop. Round your back up toward the ceiling until you feel a nice stretch in your upper, middle, and lower back. Hold this stretch for as long as it feels comfortable, or about 15 to 30 seconds. 7. Return to the starting position with a flat back while you are on your hands and knees. 8. Let your back sway by pressing your stomach toward the floor. Lift your buttocks toward the ceiling. 9. Hold this position for 15 to 30 seconds. 10. Repeat 2 to 4 times. Follow-up care is a key part of your treatment and safety. Be sure to make and go to all appointments, and call your doctor if you are having problems. It's also a good idea to know your test results and keep a list of the medicines you take. Where can you learn more? Go to http://wilson-josue.info/. Enter K915 in the search box to learn more about \"Low Back Arthritis: Exercises. \" Current as of: May 23, 2016 Content Version: 11.2 © 7889-8246 [employer]. Care instructions adapted under license by [employer] (which disclaims liability or warranty for this information). If you have questions about a medical condition or this instruction, always ask your healthcare professional. [address] any warranty or liability for your use of this information. Introducing [facility]! Dustin Solano introduces [employer] patient portal. Now you can access parts of your medical record, email your doctor's office, and request medication refills online. 1. In your internet browser, go to [email or web address] 2. Click on the First Time User? Click Here link in the Sign In box. You will see the New Member Sign Up page. 3. Enter your [employer] Access Code exactly as it appears below. You will not need to use this code after youve completed the sign-up process. If you do not sign up before the expiration date, you must request a new code. · [employer] Access Code: [email or web address] Expires: 6/4/2017  3:57 PM 
 
4. Enter the last four digits of your Social Security Number (xxxx) and Date of Birth (mm/dd/yyyy) as indicated and click Submit. You will be taken to the next sign-up page. 5. Create a [employer] ID. This will be your [employer] login ID and cannot be changed, so think of one that is secure and easy to remember. 6. Create a [employer] password. You can change your password at any time. 7. Enter your Password Reset Question and Answer. This can be used at a later time if you forget your password. 8. Enter your e-mail address. You will receive e-mail notification when new information is available in [address] 9. Click Sign Up. You can now view and download portions of your medical record. 10. Click the Download Summary menu link to download a portable copy of your medical information. If you have questions, please visit the Frequently Asked Questions section of the [employer] website. Remember, [employer] is NOT to be used for urgent needs. For medical emergencies, dial 911. Now available from your iPhone and Android! Please provide this summary of care documentation to your next provider. Your primary care clinician is listed as Charito Reddy. If you have any questions after today's visit, please call [phone].

## 2017-05-24 NOTE — PATIENT INSTRUCTIONS

## 2017-07-05 ENCOUNTER — HOSPITAL ENCOUNTER (OUTPATIENT)
Dept: LAB | Age: 57
Discharge: HOME | End: 2017-07-05

## 2017-07-05 LAB — SENTARA SPECIMEN COL,SENBCF: NORMAL

## 2017-07-05 PROCEDURE — 99001 SPECIMEN HANDLING PT-LAB: CPT | Performed by: FAMILY MEDICINE

## 2017-11-03 ENCOUNTER — HOSPITAL ENCOUNTER (OUTPATIENT)
Dept: VASCULAR SURGERY | Age: 57
Discharge: HOME | End: 2017-11-03
Attending: FAMILY MEDICINE
Payer: COMMERCIAL

## 2017-11-03 DIAGNOSIS — I83.813 VARICOSE VEINS OF BOTH LOWER EXTREMITIES WITH PAIN: ICD-10-CM

## 2017-11-03 PROCEDURE — 93970 EXTREMITY STUDY: CPT

## 2017-11-03 NOTE — PROCEDURES
[facility]  *** FINAL REPORT ***    Name: Rick Lyle  MRN: [id number]    Outpatient  : 10 Jul 1960  HIS Order #: [id number]  [address] [facility] Visit #: [id number]  Date: 2017    TYPE OF TEST: Peripheral Venous Testing    REASON FOR TEST  Pain in limb, Limb swelling    Right Leg:-  Deep venous thrombosis:           No  Superficial venous thrombosis:    No  Deep venous insufficiency:        Not examined  Superficial venous insufficiency: No    Left Leg:-  Deep venous thrombosis:           No  Superficial venous thrombosis:    No  Deep venous insufficiency:        Not examined  Superficial venous insufficiency: No      INTERPRETATION/FINDINGS  Duplex images were obtained using 2-D gray scale, color flow, and  spectral Doppler analysis. Right leg :  1. Deep veins visualized include the common femoral, femoral,  popliteal, posterior tibial and peroneal veins. 2. No evidence of deep venous thrombosis detected in the veins  visualized. 3. Superficial veins visualized include the great saphenous vein. 4. No evidence of superficial thrombosis detected. 5. No evidence of reflux detected in the superficial veins visualized. 6. Normal multiphasic flow in the posterior tibial artery. Left leg :  1. Deep veins visualized include the common femoral, femoral,  popliteal, posterior tibial and peroneal veins. 2. No evidence of deep venous thrombosis detected in the veins  visualized. 3. Superficial veins visualized include the great saphenous vein. 4. No evidence of superficial thrombosis detected. 5. No evidence of reflux detected in the superficial veins visualized. 6. Normal multiphasic flow in the posterior tibial artery. ADDITIONAL COMMENTS    I have personally reviewed the data relevant to the interpretation of  this  study. TECHNOLOGIST: AMADA Dave, FLORIDALMAS  Signed: 2017 01:19 PM    PHYSICIAN: Jerome Montalvo.  Xavier Estrella MD  Signed: 2017 08:45 AM

## 2018-01-11 ENCOUNTER — HOSPITAL ENCOUNTER (OUTPATIENT)
Dept: MAMMOGRAPHY | Age: 58
Discharge: HOME | End: 2018-01-11
Attending: FAMILY MEDICINE
Payer: COMMERCIAL

## 2018-01-11 DIAGNOSIS — Z12.31 VISIT FOR SCREENING MAMMOGRAM: ICD-10-CM

## 2018-01-11 PROCEDURE — 77067 SCR MAMMO BI INCL CAD: CPT

## 2018-06-25 ENCOUNTER — OFFICE VISIT (OUTPATIENT)
Dept: ORTHOPEDIC SURGERY | Age: 58
End: 2018-06-25

## 2018-06-25 VITALS
HEIGHT: 62 IN | OXYGEN SATURATION: 95 % | RESPIRATION RATE: 18 BRPM | DIASTOLIC BLOOD PRESSURE: 66 MMHG | WEIGHT: 208 LBS | HEART RATE: 50 BPM | TEMPERATURE: 97.8 F | SYSTOLIC BLOOD PRESSURE: 140 MMHG | BODY MASS INDEX: 38.28 KG/M2

## 2018-06-25 DIAGNOSIS — E04.9 GOITER: ICD-10-CM

## 2018-06-25 DIAGNOSIS — M47.816 LUMBAR FACET ARTHROPATHY: Primary | ICD-10-CM

## 2018-06-25 DIAGNOSIS — M48.02 CERVICAL SPINAL STENOSIS: ICD-10-CM

## 2018-06-25 DIAGNOSIS — M51.36 DDD (DEGENERATIVE DISC DISEASE), LUMBAR: ICD-10-CM

## 2018-06-25 DIAGNOSIS — M62.830 MUSCLE SPASM OF BACK: ICD-10-CM

## 2018-06-25 PROBLEM — E66.01 SEVERE OBESITY (BMI 35.0-39.9): Status: ACTIVE | Noted: 2018-06-25

## 2018-06-25 RX ORDER — METHYLPREDNISOLONE 4 MG/1
TABLET ORAL
Qty: 1 DOSE PACK | Refills: 0 | Status: SHIPPED | OUTPATIENT
Start: 2018-06-25 | End: 2019-05-02 | Stop reason: ALTCHOICE

## 2018-06-25 RX ORDER — CETIRIZINE HCL 10 MG
10 TABLET ORAL
COMMUNITY

## 2018-06-25 RX ORDER — BENAZEPRIL HYDROCHLORIDE 40 MG/1
40 TABLET ORAL DAILY
COMMUNITY

## 2018-06-25 RX ORDER — IBUPROFEN 800 MG/1
800 TABLET ORAL
COMMUNITY

## 2018-06-25 RX ORDER — AMLODIPINE BESYLATE 5 MG/1
5 TABLET ORAL DAILY
COMMUNITY
End: 2019-05-28 | Stop reason: SDUPTHER

## 2018-06-25 NOTE — PATIENT INSTRUCTIONS

## 2018-06-25 NOTE — MR AVS SNAPSHOT
[address] [address] [address] [place] [id number] 
[phone] Patient: Maria E Foster MRN: [id number] OHA:[birth year] Visit Information Date & Time Provider Department Dept. Phone Encounter #  
 6/25/2018  8:50 AM Jovan Zepeda MD [facility] [place] [phone] [id number] Follow-up Instructions Return in about 2 months (around 8/25/2018) for Diagnostic Test follow up, Medication follow up. Upcoming Health Maintenance Date Due Hepatitis C Screening 1960 DTaP/Tdap/Td series (1 - Tdap) 7/10/1979 PAP AKA CERVICAL CYTOLOGY 7/10/1979 FOBT Q 1 YEAR AGE 50-75 7/10/2008 ZOSTER VACCINE AGE 60> 5/10/2018 Influenza Age 5 to Adult 8/1/2018 BREAST CANCER SCRN MAMMOGRAM 1/11/2020 Allergies as of 6/25/2018  Review Complete On: 6/25/2018 By: Jovan Zepeda MD  
  
 Severity Noted Reaction Type Reactions Ampicillin  08/26/2011    Rash Codeine  08/26/2011    Itching Pcn [Penicillins]  08/26/2011    Rash  
 Sulfa (Sulfonamide Antibiotics)  08/26/2011    Itching Vicodin [Hydrocodone-acetaminophen]  02/25/2013   Side Effect Itching Current Immunizations  Never Reviewed No immunizations on file. Not reviewed this visit You Were Diagnosed With   
  
 Codes Comments Lumbar facet arthropathy (HCC)    -  Primary ICD-10-CM: M46.96 
ICD-9-CM: 721.3 Muscle spasm of back     ICD-10-CM: H92.013 ICD-9-CM: 724.8   
 DDD (degenerative disc disease), lumbar     ICD-10-CM: M51.36 
ICD-9-CM: 722.52 Goiter     ICD-10-CM: E04.9 ICD-9-CM: 240.9 Vitals BP Pulse Temp Resp Height(growth percentile) Weight(growth percentile) 140/66 (!) 50 97.8 °F (36.6 °C) 18 5' 2\" (1.575 m) 208 lb (94.3 kg) SpO2 BMI OB Status Smoking Status 95% 38.04 kg/m2 Hysterectomy Former Smoker BMI and BSA Data Body Mass Index Body Surface Area 38.04 kg/m 2 2.03 m 2 Preferred Pharmacy Pharmacy Name Phone [address] [place] [address]. [phone] Your Updated Medication List  
  
   
This list is accurate as of 6/25/18  9:53 AM.  Always use your most recent med list. amLODIPine 5 mg tablet Commonly known as:  Kanu Rings Take 5 mg by mouth daily. aspirin 81 mg tablet Take 81 mg by mouth daily. benazepril 40 mg tablet Commonly known as:  LOTENSIN Take 40 mg by mouth daily. cetirizine 10 mg tablet Commonly known as:  ZYRTEC Take  by mouth. ibuprofen 800 mg tablet Commonly known as:  MOTRIN Take  by mouth. methocarbamol 750 mg tablet Commonly known as:  ROBAXIN Take 1 Tab by mouth four (4) times daily. Indications: MUSCLE SPASM  
  
 methylPREDNISolone 4 mg tablet Commonly known as:  Vianey Benedict Per dose pack instructions  
  
 simvastatin 40 mg tablet Commonly known as:  ZOCOR Take 40 mg by mouth nightly. traMADol 50 mg tablet Commonly known as:  ULTRAM  
1 po bid prn severe pain  Indications: NEUROPATHIC PAIN, Pain VITAMIN D3 2,000 unit Tab Generic drug:  cholecalciferol (vitamin D3) Take  by mouth. Prescriptions Sent to Pharmacy Refills  
 methylPREDNISolone (MEDROL DOSEPACK) 4 mg tablet 0 Sig: Per dose pack instructions Class: Normal  
 Pharmacy: [facility] DR JESU HUNT [address] [place] [address].  #: [phone] We Performed the Following AMB POC XRAY, SPINE, LUMBOSACRAL; 4+ [id number] CPT(R)] Follow-up Instructions Return in about 2 months (around 8/25/2018) for Diagnostic Test follow up, Medication follow up. Patient Instructions Low Back Arthritis: Exercises Your Care Instructions Here are some examples of typical rehabilitation exercises for your condition. Start each exercise slowly.  Ease off the exercise if you start to have pain. Your doctor or physical therapist will tell you when you can start these exercises and which ones will work best for you. When you are not being active, find a comfortable position for rest. Some people are comfortable on the floor or a medium-firm bed with a small pillow under their head and another under their knees. Some people prefer to lie on their side with a pillow between their knees. Don't stay in one position for too long. Take short walks (10 to 20 minutes) every 2 to 3 hours. Avoid slopes, hills, and stairs until you feel better. Walk only distances you can manage without pain, especially leg pain. How to do the exercises Pelvic tilt 1. Lie on your back with your knees bent. 2. \"Brace\" your stomach-tighten your muscles by pulling in and imagining your belly button moving toward your spine. 3. Press your lower back into the floor. You should feel your hips and pelvis rock back. 4. Hold for 6 seconds while breathing smoothly. 5. Relax and allow your pelvis and hips to rock forward. 6. Repeat 8 to 12 times. Back stretches 1. Get down on your hands and knees on the floor. 2. Relax your head and allow it to droop. Round your back up toward the ceiling until you feel a nice stretch in your upper, middle, and lower back. Hold this stretch for as long as it feels comfortable, or about 15 to 30 seconds. 3. Return to the starting position with a flat back while you are on your hands and knees. 4. Let your back sway by pressing your stomach toward the floor. Lift your buttocks toward the ceiling. 5. Hold this position for 15 to 30 seconds. 6. Repeat 2 to 4 times. Follow-up care is a key part of your treatment and safety. Be sure to make and go to all appointments, and call your doctor if you are having problems. It's also a good idea to know your test results and keep a list of the medicines you take. Where can you learn more? Go to http://wilson-josue.info/. Enter V123 in the search box to learn more about \"Low Back Arthritis: Exercises. \" Current as of: March 21, 2017 Content Version: 11.4 © 1300-5016 [employer]wise, [employer]. Care instructions adapted under license by [employer] (which disclaims liability or warranty for this information). If you have questions about a medical condition or this instruction, always ask your healthcare professional. [address] [place] [address] any warranty or liability for your use of this information. Please provide this summary of care documentation to your next provider. Your primary care clinician is listed as Charito Reddy. If you have any questions after today's visit, please call [phone].

## 2018-06-25 NOTE — PROGRESS NOTES
[facility]  [address] [place], [address]  Phone: [phone]  Fax: [phone]    Pt's YOB: 1960    ASSESSMENT   Diagnoses and all orders for this visit:    1. Lumbar facet arthropathy (HCC)  -     methylPREDNISolone (MEDROL DOSEPACK) 4 mg tablet; Per dose pack instructions  -     [52612] LS Spine 4V    2. Muscle spasm of back  -     [17364] LS Spine 4V    3. DDD (degenerative disc disease), lumbar    4. Goiter    5. Cervical spinal stenosis         IMPRESSION AND PLAN:  Tung rAnold is a 59 y.o. female with history of chronic cervical and lumbar pain. Pt complains of increased pain across the lower back, L>R. She has been taking ibuprofen with minimal relief. pt will have a thyroidectomy on 07/06/2018.     1) Pt was given information on lumbar arthritis exercises. 2) She was prescribed a Medrol Dosepak. 3) Pt will have a thyroidectomy on 07/06/2018.  4) Ms. Arianne Lara has a reminder for a \"due or due soon\" health maintenance. I have asked that she contact her primary care provider, Arabella Will MD, for follow-up on this health maintenance. 5)  demonstrated consistency with prescribing. 6) Last UDS from 05/24/2017 was consistent. 7) Pt will follow-up in 2 months or sooner if needed. HISTORY OF PRESENT ILLNESS:  Tung Arnold is a 59 y.o. female with history of chronic cervical and lumbar pain. Pt complains of increased pain across the lower back, L>R. She notes that she occasionally experiences pain in the hips. Pt has been taking ibuprofen with minimal relief. She notes that she will have a thyroidectomy on 07/06/2018 for her goiter. Segundo Lemos She was last seen on 05/24/2017. Pt states that she ran out of the Ultram 50 mg since her last office visit. She has been followed by endocrinologist Dr. Errol Crane and ENT Dr. Valiant Seip. Pt states that she experiences improvement when using ice on her lower back. She denies any history of diabetes and notes that she takes aspirin. Pt at this time desires to proceed with medication evaluation    Pain Scale: 5/10    PCP: Mohini Hatch MD     Past Medical History:   Diagnosis Date    Cellulitis of breast, left     Degenerative cervical disc     Dyslipidemia     Goiter     History of echocardiogram 12/16/2011    EF 65-70%. RVSP 30-35. No significant valvular heart disease.  Hypercholesterolemia     Hypertension     Hypertension     Left leg pain     Lower extremity peripheral arterial testing 01/30/2012    No peripheral arterial disease bilaterally at rest.  Exercise deferred. R IVORY 1.06.  L IVORY 1.06.    Normal nuclear stress test 08/11/2005    No ischemia or prior infarction. EF 76%. No reg'l WMA. Neg EKG on max EST. Ex time 7 mins.  Thyroid disease         Social History     Social History    Marital status: SINGLE     Spouse name: N/A    Number of children: N/A    Years of education: N/A     Occupational History    Not on file. Social History Main Topics    Smoking status: Former Smoker     Packs/day: 0.50     Years: 15.00     Quit date: 12/16/2010    Smokeless tobacco: Never Used    Alcohol use Yes      Comment: occasionally    Drug use: No    Sexual activity: Not on file     Other Topics Concern    Not on file     Social History Narrative       Current Outpatient Prescriptions   Medication Sig Dispense Refill    cetirizine (ZYRTEC) 10 mg tablet Take  by mouth.  ibuprofen (MOTRIN) 800 mg tablet Take  by mouth.  amLODIPine (NORVASC) 5 mg tablet Take 5 mg by mouth daily.  benazepril (LOTENSIN) 40 mg tablet Take 40 mg by mouth daily.  methylPREDNISolone (MEDROL DOSEPACK) 4 mg tablet Per dose pack instructions 1 Dose Pack 0    cholecalciferol, vitamin D3, (VITAMIN D3) 2,000 unit tab Take  by mouth.  aspirin 81 mg tablet Take 81 mg by mouth daily.       simvastatin (ZOCOR) 40 mg tablet Take 40 mg by mouth nightly.  traMADol (ULTRAM) 50 mg tablet 1 po bid prn severe pain  Indications: NEUROPATHIC PAIN, Pain (Patient not taking: Reported on 6/25/2018) 60 Tab 0    methocarbamol (ROBAXIN) 750 mg tablet Take 1 Tab by mouth four (4) times daily. Indications: MUSCLE SPASM (Patient not taking: Reported on 6/25/2018) 60 Tab 1       Allergies   Allergen Reactions    Ampicillin Rash    Codeine Itching    Pcn [Penicillins] Rash    Sulfa (Sulfonamide Antibiotics) Itching    Vicodin [Hydrocodone-Acetaminophen] Itching         REVIEW OF SYSTEMS    Constitutional: Negative for fever, chills, or weight change. Respiratory: Negative for cough or shortness of breath. Cardiovascular: Negative for chest pain or palpitations. Gastrointestinal: Negative for acid reflux, change in bowel habits, or constipation; positive for difficulty swallowing  Genitourinary: Negative for dysuria and flank pain. Musculoskeletal: Positive for lumbar pain. Skin: Negative for rash. Neurological: Negative for headaches, dizziness, or numbness. Endo/Heme/Allergies: Negative for increased bruising. Psychiatric/Behavioral: Negative for difficulty with sleep. PHYSICAL EXAMINATION  Visit Vitals    /66    Pulse (!) 50    Temp 97.8 °F (36.6 °C)    Resp 18    Ht 5' 2\" (1.575 m)    Wt 208 lb (94.3 kg)    SpO2 95%    BMI 38.04 kg/m2       Constitutional: Awake, alert, and in no acute distress. Neck: enlarged goiter without erythema, warmth or tenderness to palpation  Neurological: 1+ symmetrical DTRs in the upper extremities. 1+ symmetrical DTRs in the lower extremities. Sensation to light touch is intact. Negative Nick's sign bilaterally. Skin: warm, dry, and intact. Musculoskeletal: Tenderness to palpation in the lower lumbar region. Moderate pain with extension and axial loading. No pain with internal or external rotation of her hips.  Negative straight leg raise bilaterally; tight across the trapezius bilaterally      Biceps  Triceps Deltoids Wrist Ext Wrist Flex Hand Intrin   Right +4/5 +4/5 +4/5 +4/5 +4/5 +4/5   Left +4/5 +4/5 +4/5 +4/5 +4/5 +4/5      Hip Flex  Quads Hamstrings Ankle DF EHL Ankle PF   Right +4/5 +4/5 +4/5 +4/5 +4/5 +4/5   Left +4/5 +4/5 +4/5 +4/5 +4/5 +4/5     IMAGING:    Lumbar spine 4V x-rays from 06/25/2018 were personally reviewed with the patient and demonstrated:  Calcification in the aorta. Multilevel degenerative facets L3-5. Degenerative disc at L5-S1. Cervical spine MRI from 04/28/2017 was personally reviewed with the Pt and demonstrated:  Results from Hospital Encounter on 04/28/17   MRI CERV SPINE WO CONT    Narrative EXAM:  MRI CERV SPINE WO CONT    INDICATION:   increase pain, weakness    COMPARISON: None. TECHNIQUE: MR imaging of the cervical spine was performed including sagittal T1,  T2, STIR;  axial GRE, T2, T1. Contrast was not administered. FINDINGS:  Straightening of the cervical spine. Susceptibility artifact from C5 to C6. Mild/moderate degenerative discogenic disease with endplate edema at X5-P4. Slight retrolisthesis of C4 on C5. Remainder vertebral bodies maintain normal  alignment. Mild degenerative discogenic disease C3-C4, moderate degenerative  discogenic disease at C6-C7. Vertebral body heights are maintained. Narrow  cervical spinal canal on a congenital basis . The craniocervical junction is  intact. The course, caliber, and signal intensity of the spinal cord are  normal.      C2/3:  Mild disc bulge on the right. Mild facet arthropathy. Mild right  foraminal stenosis. Mild narrowing of the right central canal.    C3/4:  Small central disc protrusion with annular tear. Potential slight contact  with the ventral cervical cord. Mild facet arthropathy. Mild foraminal stenosis. Mild central canal stenosis, midline AP diameter is 7.8 mm.    C4/5:  Disc osteophyte complex and posterior ligamentous hypertrophy.  Moderate  central canal stenosis, midline AP diameter is 6.3. Moderate left and moderate  to severe right foraminal stenosis. C5/6:  Level of fusion. Central canal and neural foramen are patent. C6/7:  Diffuse disc bulge with central disc extrusion. Disc material extends  slightly inferiorly. Moderate to severe left and moderate right foraminal  stenosis. Posterior ligamentous hypertrophy. Moderate central canal stenosis,  midline AP diameter is 6.6 mm.    C7/T1:  No significant central canal or foraminal stenosis.          Impression IMPRESSION:      1. Anterior fusion of C5 and C6. Central canal and neural foramen are patent. 2. Degenerative changes at C4-C5 and C6-C7 moderately narrowing the central  canal and moderate to severely narrowing the neural foramen. Degenerative discogenic disease with endplate edema at T2-N3. Central disc extrusion at C6-C7.               Lumbar Spine 4V X-rays from 01/23/2017 were personally reviewed with the Pt and demonstrated:  1) Multilevel degenerative facets. 2) Mild degenerative disc at L5-S1. 3) Calcification aorta. 4) Good alignment.       Written by Rachel Haswell, as dictated by Balbir Tyler MD.  I, Dr. Balbir Tyler confirm that all documentation is accurate.

## 2018-09-18 ENCOUNTER — OFFICE VISIT (OUTPATIENT)
Dept: ORTHOPEDIC SURGERY | Facility: CLINIC | Age: 58
End: 2018-09-18

## 2018-09-18 VITALS
WEIGHT: 209 LBS | HEART RATE: 59 BPM | SYSTOLIC BLOOD PRESSURE: 129 MMHG | HEIGHT: 62 IN | OXYGEN SATURATION: 94 % | BODY MASS INDEX: 38.46 KG/M2 | TEMPERATURE: 98.2 F | DIASTOLIC BLOOD PRESSURE: 72 MMHG

## 2018-09-18 DIAGNOSIS — R52 PAIN: Primary | ICD-10-CM

## 2018-09-18 DIAGNOSIS — M77.02 MEDIAL EPICONDYLITIS OF ELBOW, LEFT: ICD-10-CM

## 2018-09-18 DIAGNOSIS — M25.522 LEFT ELBOW PAIN: ICD-10-CM

## 2018-09-18 RX ORDER — MELOXICAM 15 MG/1
15 TABLET ORAL DAILY
Qty: 30 TAB | Refills: 1 | Status: SHIPPED | OUTPATIENT
Start: 2018-09-18 | End: 2019-05-02 | Stop reason: ALTCHOICE

## 2018-09-18 RX ORDER — DICLOFENAC SODIUM 10 MG/G
GEL TOPICAL 4 TIMES DAILY
Qty: 5 EACH | Refills: 1 | Status: SHIPPED | OUTPATIENT
Start: 2018-09-18 | End: 2019-05-02 | Stop reason: ALTCHOICE

## 2018-09-18 NOTE — PROGRESS NOTES
[address] [facility], [address] [facility]  [phone]           Patient: Eric Vasquez                MRN: [id number]       SSN: [id number]  YOB: 1960        AGE: 59 y.o. SEX: female  Body mass index is 38.23 kg/(m^2). PCP: Юлия Pierce MD  09/18/18      This office note has been dictated. REVIEW OF SYSTEMS:  Constitutional: Negative for fever, chills, weight loss and malaise/fatigue. HENT: Negative. Eyes: Negative. Respiratory: Negative. Cardiovascular: Negative. Gastrointestinal: No bowel incontinence or constipation. Genitourinary: No bladder incontinence or saddle anesthesia. Skin: Negative. Neurological: Negative. Endo/Heme/Allergies: Negative. Psychiatric/Behavioral: Negative. Musculoskeletal: As per HPI above. Past Medical History:   Diagnosis Date    Cellulitis of breast, left     Degenerative cervical disc     Dyslipidemia     Goiter     History of echocardiogram 12/16/2011    EF 65-70%. RVSP 30-35. No significant valvular heart disease.  Hypercholesterolemia     Hypertension     Hypertension     Left leg pain     Lower extremity peripheral arterial testing 01/30/2012    No peripheral arterial disease bilaterally at rest.  Exercise deferred. R IVORY 1.06.  L IVORY 1.06.    Normal nuclear stress test 08/11/2005    No ischemia or prior infarction. EF 76%. No reg'l WMA. Neg EKG on max EST. Ex time 7 mins.  Thyroid disease          Current Outpatient Prescriptions:     diclofenac (VOLTAREN) 1 % gel, Apply  to affected area four (4) times daily. , Disp: 5 Each, Rfl: 1    meloxicam (MOBIC) 15 mg tablet, Take 1 Tab by mouth daily. , Disp: 30 Tab, Rfl: 1    cetirizine (ZYRTEC) 10 mg tablet, Take  by mouth., Disp: , Rfl:     ibuprofen (MOTRIN) 800 mg tablet, Take  by mouth., Disp: , Rfl:     amLODIPine (NORVASC) 5 mg tablet, Take 5 mg by mouth daily. , Disp: , Rfl:   benazepril (LOTENSIN) 40 mg tablet, Take 40 mg by mouth daily. , Disp: , Rfl:     cholecalciferol, vitamin D3, (VITAMIN D3) 2,000 unit tab, Take  by mouth., Disp: , Rfl:     aspirin 81 mg tablet, Take 81 mg by mouth daily. , Disp: , Rfl:     simvastatin (ZOCOR) 40 mg tablet, Take 40 mg by mouth nightly., Disp: , Rfl:     methylPREDNISolone (MEDROL DOSEPACK) 4 mg tablet, Per dose pack instructions, Disp: 1 Dose Pack, Rfl: 0    traMADol (ULTRAM) 50 mg tablet, 1 po bid prn severe pain  Indications: NEUROPATHIC PAIN, Pain (Patient not taking: Reported on 6/25/2018), Disp: 60 Tab, Rfl: 0    methocarbamol (ROBAXIN) 750 mg tablet, Take 1 Tab by mouth four (4) times daily. Indications: MUSCLE SPASM (Patient not taking: Reported on 6/25/2018), Disp: 60 Tab, Rfl: 1    Allergies   Allergen Reactions    Ampicillin Rash    Codeine Itching    Pcn [Penicillins] Rash    Sulfa (Sulfonamide Antibiotics) Itching    Vicodin [Hydrocodone-Acetaminophen] Itching       Social History     Social History    Marital status: SINGLE     Spouse name: N/A    Number of children: N/A    Years of education: N/A     Occupational History    Not on file. Social History Main Topics    Smoking status: Former Smoker     Packs/day: 0.50     Years: 15.00     Quit date: 12/16/2010    Smokeless tobacco: Never Used    Alcohol use Yes      Comment: occasionally    Drug use: No    Sexual activity: Not on file     Other Topics Concern    Not on file     Social History Narrative       Past Surgical History:   Procedure Laterality Date    ENDOSCOPY, COLON, DIAGNOSTIC  11/08    HX BREAST LUMPECTOMY  1996    left breast    HX BUNIONECTOMY      HX CERVICAL FUSION  3/04         HX HEENT  11/2016    Biopsy on nodule in neck    HX HYSTERECTOMY  1990    HX ORTHOPAEDIC  12/01    hammertoe rt lt    HX TUBAL LIGATION                 elbow pain. She has had left elbow pain for the past week or so.   She states that she was rolling down the window of her car forcefully as a window was stuck and has some elbow discomfort afterward. It is not improving. She has been using Bengay on it. No anti-inflammatories. No pain medicine. She denies any numbness or tingling. She denies any previous injuries to the left elbow. She denies any neck problems. PHYSICAL EXAMINATION: In general, the patient is alert and oriented x3 and is in no acute distress. The patient is well-developed and well-nourished. The patient is afebrile. HEENT:  Head is normocephalic and atraumatic. Extraocular eye movements are intact. No JVD is present. Breathing is nonlabored. Examination of neck diminished motion of the cervical spine without reproduction of symptoms. Neurovascular status intact in the upper extremities. C5-T2 myotomes and dermatomes are intact. She has full range of motion the shoulder, elbow, wrist, and hand. There is discomfort to palpation to the medial epicondyle of the left elbow. Some discomfort with resisted wrist flexion on the left side. RADIOGRAPHS:  Radiographs taken in the office today including AP, 3 views of the left elbow, shows no acute bony abnormalities. No fat sign is noted. ASSESSMENT:  Left elbow medial epicondylitis. PLAN:  At this point, we discussed treatment options. We will get her tennis elbow brace. She is instructed on anti-inflammatories. We will start her on Mobic 15 mg a day taken with food as well as Voltaren Gel to apply to the elbow q.i.d. We will see her back in about 3 or 4 weeks' time for evaluation and consider physical therapy or possibly injection at that point if she would like. She will call with any questions or if concerns arise.     CC:  MD JR Lizett Jimenez, PAJOHNNY, ATC

## 2018-09-20 ENCOUNTER — TELEPHONE (OUTPATIENT)
Dept: ORTHOPEDIC SURGERY | Age: 58
End: 2018-09-20

## 2018-09-20 NOTE — TELEPHONE ENCOUNTER
Patient called and said that [facility] DR JESU HUNT told her that they need to speak with Donta Santos about the Voltaren Gel. Patient said she does not know why, only that they will not give her the medication until they speak with Milton Velez. [place] [facility]. [phone]. Patient would like a call when done tel. [phone].

## 2018-10-03 ENCOUNTER — HOSPITAL ENCOUNTER (OUTPATIENT)
Dept: LAB | Age: 58
Discharge: HOME | End: 2018-10-03

## 2018-10-03 LAB — SENTARA SPECIMEN COL,SENBCF: NORMAL

## 2018-10-03 PROCEDURE — 99001 SPECIMEN HANDLING PT-LAB: CPT | Performed by: INTERNAL MEDICINE

## 2018-10-04 ENCOUNTER — TELEPHONE (OUTPATIENT)
Dept: ORTHOPEDIC SURGERY | Age: 58
End: 2018-10-04

## 2018-10-04 DIAGNOSIS — M77.02 MEDIAL EPICONDYLITIS, LEFT ELBOW: Primary | ICD-10-CM

## 2018-10-04 NOTE — TELEPHONE ENCOUNTER
Patient called to report that the rx for meloxicam (MOBIC) 15 mg tablet and Voltaren are not working for her left arm pain. She's requesting a different medication.   Please review and advise patient at [phone]

## 2018-10-08 ENCOUNTER — TELEPHONE (OUTPATIENT)
Dept: ORTHOPEDIC SURGERY | Age: 58
End: 2018-10-08

## 2018-10-08 NOTE — TELEPHONE ENCOUNTER
Radha Tijerina with [facility] [phone] needs the order for PT of the L Elbow changed to OT.

## 2019-01-14 ENCOUNTER — HOSPITAL ENCOUNTER (OUTPATIENT)
Dept: MAMMOGRAPHY | Age: 59
Discharge: HOME | End: 2019-01-14
Attending: FAMILY MEDICINE
Payer: COMMERCIAL

## 2019-01-14 DIAGNOSIS — Z12.31 VISIT FOR SCREENING MAMMOGRAM: ICD-10-CM

## 2019-01-14 PROCEDURE — 77063 BREAST TOMOSYNTHESIS BI: CPT

## 2019-05-02 ENCOUNTER — OFFICE VISIT (OUTPATIENT)
Dept: CARDIOLOGY CLINIC | Age: 59
End: 2019-05-02

## 2019-05-02 VITALS
BODY MASS INDEX: 39.38 KG/M2 | HEART RATE: 54 BPM | HEIGHT: 62 IN | SYSTOLIC BLOOD PRESSURE: 138 MMHG | WEIGHT: 214 LBS | OXYGEN SATURATION: 98 % | DIASTOLIC BLOOD PRESSURE: 76 MMHG

## 2019-05-02 DIAGNOSIS — E78.5 DYSLIPIDEMIA: ICD-10-CM

## 2019-05-02 DIAGNOSIS — E07.9 THYROID DISORDER: ICD-10-CM

## 2019-05-02 DIAGNOSIS — I10 ESSENTIAL HYPERTENSION: ICD-10-CM

## 2019-05-02 DIAGNOSIS — I49.9 IRREGULAR HEART RATE: ICD-10-CM

## 2019-05-02 DIAGNOSIS — R00.1 BRADYCARDIA: Primary | ICD-10-CM

## 2019-05-02 RX ORDER — LEVOTHYROXINE SODIUM 50 UG/1
50 TABLET ORAL
Refills: 3 | COMMUNITY
Start: 2019-04-26

## 2019-05-02 NOTE — PROGRESS NOTES
HPI: A 62-year old female referred for bradycardia. She saw Dr. Geovanna Hearn a few years ago for some atypical chest pain. Initial nuclear stress test showed possible anterior ischemia, however, stress echocardiogram was unremarkable. Her chest pain completely resolved. I suspect her initial stress test was due to artifact. She has a history of hypertension and dyslipidemia. She denies diabetes or tobacco use. She was seen by her eye doctor and heart rate was noted to be in the 40-50s. She had no symptoms of dyspnea, chest pain, fatigue, presyncope or syncope. She was taking Timolol eye drops and these have been discontinued by her primary. She is scheduled to have eye surgery on the left and then right on 5/9/19 and 5/23/19 subsequently. Her blood pressure runs a bit on the high side at times with systolic in the 113U by report when she checks it at the pharmacy. Impression/Plan:  1. Asymptomatic resting bradycardia likely worsened with Timolol eye drops, no associated symptoms. 2. Remote atypical chest pain 2016 with initial nuclear stress test abnormal, likely artifact. Follow up stress echocardiogram unremarkable. Chest pain had resolved. 3. Hypertension, borderline control on medications. 4. Dyslipidemia. 5. Planned laser eye surgery on left and then right 5/9 and 5/23/19. 6. History of thyroid disorder on replacement with history of thyroid surgery. At this point there is no evidence of unstable angina or congestive heart failure. Her heart rate goes a bit on the slow side but she is rather sedentary and has no symptoms. Her BMI is 39. I will check an echocardiogram as she may have some hypertensive heart disease since she has hypertension. I defer to her primary physician to continue to titrate her Lotrel as needed to keep her systolic blood pressure less than 120 mmHg consistently. I would avoid all AV blocking agents including beta blocker eye drops.  If her echocardiogram is unremarkable, I would recommend a repeat EKG in about a year unless her symptoms change. Thank you kindly for allowing me to participate in this patient's care. Past Medical History:   Diagnosis Date    Cellulitis of breast, left     Degenerative cervical disc     Dyslipidemia     Goiter     History of echocardiogram 12/16/2011    EF 65-70%. RVSP 30-35. No significant valvular heart disease.  Hypercholesterolemia     Hypertension     Hypertension     Left leg pain     Lower extremity peripheral arterial testing 01/30/2012    No peripheral arterial disease bilaterally at rest.  Exercise deferred. R IVORY 1.06.  L IVORY 1.06.    Normal nuclear stress test 08/11/2005    No ischemia or prior infarction. EF 76%. No reg'l WMA. Neg EKG on max EST. Ex time 7 mins.  Thyroid disease        Current Outpatient Medications   Medication Sig Dispense Refill    levothyroxine (SYNTHROID) 50 mcg tablet   3    cetirizine (ZYRTEC) 10 mg tablet Take  by mouth.  ibuprofen (MOTRIN) 800 mg tablet Take  by mouth.  amLODIPine (NORVASC) 5 mg tablet Take 5 mg by mouth daily.  benazepril (LOTENSIN) 40 mg tablet Take 40 mg by mouth daily.  cholecalciferol, vitamin D3, (VITAMIN D3) 2,000 unit tab Take  by mouth.  aspirin 81 mg tablet Take 81 mg by mouth daily.  simvastatin (ZOCOR) 40 mg tablet Take 40 mg by mouth nightly. Social History   reports that she quit smoking about 8 years ago. She has a 7.50 pack-year smoking history. She has never used smokeless tobacco.   reports that she drinks alcohol. Family History  family history includes Heart Attack (age of onset: 35) in her sister; Heart Disease in her sister; Hypertension in her father; Stroke in her father; Tuberculosis in her mother. Review of Systems  Except as stated above include:  Constitutional: Negative for fever, chills and malaise/fatigue. HEENT: No congestion or recent URI.   Gastrointestinal: No nausea, vomiting, abdominal pain, bloody stools. Pulmonary:  Negative except as stated above. Cardiac:  Negative except as stated above. Musculoskeletal: Negative except as stated above. Neurological:  No localized symptoms. Skin:  Negative except as stated above. Psych:  Negative except as stated above. Endocrine:  Negative except as stated above. PHYSICAL EXAM  BP Readings from Last 3 Encounters:   05/02/19 138/76   09/18/18 129/72   06/25/18 140/66     Pulse Readings from Last 3 Encounters:   05/02/19 (!) 54   09/18/18 (!) 59   06/25/18 (!) 50     Wt Readings from Last 3 Encounters:   05/02/19 97.1 kg (214 lb)   09/18/18 94.8 kg (209 lb)   06/25/18 94.3 kg (208 lb)     General:   Well developed, well groomed. Head/Neck:   No jugular venous distention     No carotid bruits. No evidence of xanthelasma. Lungs:   No respiratory distress. Clear bilaterally. Heart:    Lela Poor, regular. Normal S1/S2. Palpation of heart with normal point of maximum impulse. No significant murmurs, rubs or gallops. Abdomen:   Soft and nontender. No palpable abdominal mass or bruits. Extremities:   Intact peripheral pulses. No significant edema. Neurological:   Alert and oriented to person, place, time. No focal neurological deficit visually. Skin:   No obvious rash    Blood Pressure Metric:  Monitor recommended and adjustments stated if needed.

## 2019-05-02 NOTE — PROGRESS NOTES
Irmakendall Dodd presents today for   Chief Complaint   Patient presents with    New Patient     Last seen by Hoffmier/Pt c/o low HR        Jacque Dodd preferred language for health care discussion is english/other. Is someone accompanying this pt? No     Is the patient using any DME equipment during OV? No     Depression Screening:  3 most recent PHQ Screens 9/18/2018   Little interest or pleasure in doing things Not at all   Feeling down, depressed, irritable, or hopeless Not at all   Total Score PHQ 2 0       Learning Assessment:  No flowsheet data found. Abuse Screening:  No flowsheet data found. Fall Risk  No flowsheet data found. Pt currently taking Antiplatelet therapy? ASA     Coordination of Care:  1. Have you been to the ER, urgent care clinic since your last visit? Hospitalized since your last visit? No     2. Have you seen or consulted any other health care providers outside of the [address] [place] [address] since your last visit? Include any pap smears or colon screening.  No

## 2019-05-15 ENCOUNTER — TELEPHONE (OUTPATIENT)
Dept: CARDIOLOGY CLINIC | Age: 59
End: 2019-05-15

## 2019-05-15 NOTE — TELEPHONE ENCOUNTER
Patient called to inform office she would not be able to get the echo as it will cost her $1000 out of pocket.

## 2019-05-16 ENCOUNTER — OFFICE VISIT (OUTPATIENT)
Dept: ORTHOPEDIC SURGERY | Facility: CLINIC | Age: 59
End: 2019-05-16

## 2019-05-16 VITALS
TEMPERATURE: 97.5 F | SYSTOLIC BLOOD PRESSURE: 140 MMHG | WEIGHT: 210.6 LBS | BODY MASS INDEX: 38.76 KG/M2 | DIASTOLIC BLOOD PRESSURE: 76 MMHG | RESPIRATION RATE: 16 BRPM | OXYGEN SATURATION: 93 % | HEART RATE: 57 BPM | HEIGHT: 62 IN

## 2019-05-16 DIAGNOSIS — M79.601 RIGHT ARM PAIN: Primary | ICD-10-CM

## 2019-05-16 DIAGNOSIS — M77.01 MEDIAL EPICONDYLITIS OF ELBOW, RIGHT: ICD-10-CM

## 2019-05-16 DIAGNOSIS — M54.2 NECK PAIN: ICD-10-CM

## 2019-05-16 DIAGNOSIS — M79.644 PAIN IN FINGER OF RIGHT HAND: ICD-10-CM

## 2019-05-16 RX ORDER — DICLOFENAC SODIUM 10 MG/G
4 GEL TOPICAL 4 TIMES DAILY
Qty: 100 G | Refills: 4 | Status: SHIPPED | OUTPATIENT
Start: 2019-05-16 | End: 2020-05-26

## 2019-05-16 RX ORDER — IBUPROFEN 800 MG/1
800 TABLET ORAL 2 TIMES DAILY WITH MEALS
Qty: 90 TAB | Refills: 0 | Status: SHIPPED | OUTPATIENT
Start: 2019-05-16

## 2019-05-16 NOTE — PROGRESS NOTES
[address] [place] [address] Drive, [address] [place] [address]  [phone]           Patient: Jose Roberto Wick                MRN: [id number]       SSN: [id number]  YOB: 1960        AGE: 59 y.o. SEX: female  Body mass index is 38.52 kg/m². PCP: Velma Mosqueda MD  05/16/19      This office note has been dictated. REVIEW OF SYSTEMS:  Constitutional: Negative for fever, chills, weight loss and malaise/fatigue. HENT: Negative. Eyes: Negative. Respiratory: Negative. Cardiovascular: Negative. Gastrointestinal: No bowel incontinence or constipation. Genitourinary: No bladder incontinence or saddle anesthesia. Skin: Negative. Neurological: Negative. Endo/Heme/Allergies: Negative. Psychiatric/Behavioral: Negative. Musculoskeletal: As per HPI above. Past Medical History:   Diagnosis Date    Cellulitis of breast, left     Degenerative cervical disc     Dyslipidemia     Goiter     History of echocardiogram 12/16/2011    EF 65-70%. RVSP 30-35. No significant valvular heart disease.  Hypercholesterolemia     Hypertension     Hypertension     Left leg pain     Lower extremity peripheral arterial testing 01/30/2012    No peripheral arterial disease bilaterally at rest.  Exercise deferred. R IVORY 1.06.  L IVORY 1.06.    Normal nuclear stress test 08/11/2005    No ischemia or prior infarction. EF 76%. No reg'l WMA. Neg EKG on max EST. Ex time 7 mins.  Thyroid disease          Current Outpatient Medications:     levothyroxine (SYNTHROID) 50 mcg tablet, , Disp: , Rfl: 3    cetirizine (ZYRTEC) 10 mg tablet, Take  by mouth., Disp: , Rfl:     ibuprofen (MOTRIN) 800 mg tablet, Take  by mouth., Disp: , Rfl:     amLODIPine (NORVASC) 5 mg tablet, Take 5 mg by mouth daily. , Disp: , Rfl:     benazepril (LOTENSIN) 40 mg tablet, Take 40 mg by mouth daily. , Disp: , Rfl:     cholecalciferol, vitamin D3, (VITAMIN D3) 2,000 unit tab, Take  by mouth., Disp: , Rfl:     aspirin 81 mg tablet, Take 81 mg by mouth daily. , Disp: , Rfl:     simvastatin (ZOCOR) 40 mg tablet, Take 40 mg by mouth nightly., Disp: , Rfl:     Allergies   Allergen Reactions    Ampicillin Rash    Codeine Itching    Pcn [Penicillins] Rash    Sulfa (Sulfonamide Antibiotics) Itching    Vicodin [Hydrocodone-Acetaminophen] Itching       Social History     Socioeconomic History    Marital status: SINGLE     Spouse name: Not on file    Number of children: Not on file    Years of education: Not on file    Highest education level: Not on file   Occupational History    Not on file   Social Needs    Financial resource strain: Not on file    Food insecurity:     Worry: Not on file     Inability: Not on file    Transportation needs:     Medical: Not on file     Non-medical: Not on file   Tobacco Use    Smoking status: Former Smoker     Packs/day: 0.50     Years: 15.00     Pack years: 7.50     Last attempt to quit: 2010     Years since quittin.4    Smokeless tobacco: Never Used   Substance and Sexual Activity    Alcohol use: Yes     Comment: occasionally    Drug use: No    Sexual activity: Not on file   Lifestyle    Physical activity:     Days per week: Not on file     Minutes per session: Not on file    Stress: Not on file   Relationships    Social connections:     Talks on phone: Not on file     Gets together: Not on file     Attends [employer] service: Not on file     Active member of club or organization: Not on file     Attends meetings of clubs or organizations: Not on file     Relationship status: Not on file    Intimate partner violence:     Fear of current or ex partner: Not on file     Emotionally abused: Not on file     Physically abused: Not on file     Forced sexual activity: Not on file   Other Topics Concern    Not on file   Social History Narrative    Not on file       Past Surgical History:   Procedure Laterality Date    ENDOSCOPY, COLON, DIAGNOSTIC  11/08    HX BREAST LUMPECTOMY  1996    left breast    HX BUNIONECTOMY      HX CERVICAL FUSION  3/04         HX HEENT  11/2016    Biopsy on nodule in neck    HX HYSTERECTOMY  1990    HX ORTHOPAEDIC  12/01    hammertoe rt lt    HX TUBAL LIGATION               We did see Ms. Andrsesa Francois today in the office for followup with regards to complaints of some neck discomfort and right arm discomfort. It has been ongoing off and on. She has pain in her neck and her shoulder down into her first index finger on her right hand. She is also reporting some elbow discomfort. She works as a . She has had a history of a cervical spine fusion by Dr. Marzena Madrid in the past.     PHYSICAL EXAMINATION:  In general, the patient is alert and oriented x 3 in no acute distress. The patient is well-developed, well-nourished, with a normal affect. The patient is afebrile. HEENT:  Head is normocephalic and atraumatic. Pupils are equally round and reactive to light and accommodation. Extraocular eye movements are intact. Neck is supple. Trachea is midline. No JVD is present. Breathing is nonlabored. Examination of the neck reveals good range of motion of the cervical spine without reproduction of symptoms. There is a negative Spurling's. Sensory and motor are intact to the upper extremities and symmetric bilaterally C5 to T2 myotomes and dermatomes. Radial pulse is 2+, and capillary refill is within normal limits. Range of motion of the right shoulder shows full range of motion. She has a negative Adamson', negative Neer's, and negative drop-arm, Speed's, and [place]'s. The right elbow reveals the skin is intact. There is no ecchymosis, no warmth, and no signs of infection or cellulitis present. She does have pain to palpation to the medial epicondyle and a little discomfort with resisted wrist flexion. ASSESSMENT:      1. Cervical radiculopathy.    2. Right elbow medial epicondylitis. PLAN:  At this point, I have asked her to get back over to Dr. [facility] regarding her neck. I think that most of her problem is radicular in nature. She does have golfer's elbow. We will get her into an epicondylar brace. We will try some Voltaren gel to apply to the area four times a day, as well as a prescription for Ibuprofen, which will be sent to her pharmacy. We will see her back as needed.                   JR Keanu EPSTEIN, PA-C, ATC

## 2019-05-28 ENCOUNTER — OFFICE VISIT (OUTPATIENT)
Dept: ORTHOPEDIC SURGERY | Age: 59
End: 2019-05-28

## 2019-05-28 VITALS
HEART RATE: 58 BPM | DIASTOLIC BLOOD PRESSURE: 70 MMHG | RESPIRATION RATE: 18 BRPM | OXYGEN SATURATION: 95 % | TEMPERATURE: 98.1 F | SYSTOLIC BLOOD PRESSURE: 137 MMHG | HEIGHT: 62 IN | WEIGHT: 208 LBS | BODY MASS INDEX: 38.28 KG/M2

## 2019-05-28 DIAGNOSIS — Z98.1 S/P CERVICAL SPINAL FUSION: ICD-10-CM

## 2019-05-28 DIAGNOSIS — M62.838 MUSCLE SPASM: ICD-10-CM

## 2019-05-28 DIAGNOSIS — M79.18 MYOFASCIAL PAIN: ICD-10-CM

## 2019-05-28 DIAGNOSIS — M54.12 CERVICAL RADICULOPATHY: ICD-10-CM

## 2019-05-28 DIAGNOSIS — M48.02 CERVICAL SPINAL STENOSIS: Primary | ICD-10-CM

## 2019-05-28 RX ORDER — GABAPENTIN 300 MG/1
CAPSULE ORAL
Qty: 60 CAP | Refills: 1 | Status: SHIPPED | OUTPATIENT
Start: 2019-05-28 | End: 2019-05-28

## 2019-05-28 RX ORDER — METHYLPREDNISOLONE 4 MG/1
TABLET ORAL
Qty: 1 DOSE PACK | Refills: 0 | Status: SHIPPED | OUTPATIENT
Start: 2019-05-28

## 2019-05-28 RX ORDER — IBUPROFEN 800 MG/1
800 TABLET ORAL 2 TIMES DAILY WITH MEALS
Qty: 60 TAB | Refills: 2 | Status: SHIPPED | OUTPATIENT
Start: 2019-05-28

## 2019-05-28 RX ORDER — AMLODIPINE BESYLATE 10 MG/1
1 TABLET ORAL DAILY
Refills: 3 | COMMUNITY
Start: 2019-05-06

## 2019-05-28 RX ORDER — GABAPENTIN 300 MG/1
CAPSULE ORAL
Qty: 60 CAP | Refills: 1 | Status: SHIPPED | OUTPATIENT
Start: 2019-05-28 | End: 2019-06-27

## 2019-05-28 NOTE — PROGRESS NOTES
[facility]  [address] [place] [address], [facility] [address]  Phone: [phone]  Fax: [phone]    Pt's YOB: 1960    ASSESSMENT   Diagnoses and all orders for this visit:    1. Cervical spinal stenosis  -     methylPREDNISolone (MEDROL DOSEPACK) 4 mg tablet; Per dose pack instructions  -     ibuprofen (MOTRIN) 800 mg tablet; Take 1 Tab by mouth two (2) times daily (with meals). -     gabapentin (NEURONTIN) 300 mg capsule; Take 1 Cap by mouth nightly for 7 days, THEN 1 Cap two (2) times a day for 23 days. 2. Cervical radiculopathy  -     methylPREDNISolone (MEDROL DOSEPACK) 4 mg tablet; Per dose pack instructions  -     ibuprofen (MOTRIN) 800 mg tablet; Take 1 Tab by mouth two (2) times daily (with meals). -     gabapentin (NEURONTIN) 300 mg capsule; Take 1 Cap by mouth nightly for 7 days, THEN 1 Cap two (2) times a day for 23 days. 3. Muscle spasm    4. Myofascial pain  -     ibuprofen (MOTRIN) 800 mg tablet; Take 1 Tab by mouth two (2) times daily (with meals). 5. S/P cervical spinal fusion    6. BMI 38.0-38.9,adult         IMPRESSION AND PLAN:  Shukri Kincaid is a 59 y.o. female with history of chronic cervical and lumbar pain. She complains of pain in the neck that radiates down the right arm, particularly to the shoulder. Pt also complains of pain in the lower back and very intermittent pain in the right leg. Of note, she had a previous cervical fusion in 2004 or 2007 with Dr. Marzena Madrid. She takes ibuprofen 800 mg up to BID as needed. 1) Pt was given information on cervical and lumbar arthritis exercises. 2) She was prescribed Neurontin 300 mg 2 tabs QHS as directed. 3) I recommended that the patient lose weight. 4) She was prescribed a Medrol Dosepak. 5) Pt received a refill of ibuprofen 800 mg, which she may start after she completes the Medrol Dosepak. 6) She was offered a referral to physical therapy but declined.   7) Ms. Beronica Ramon has a reminder for a \"due or due soon\" health maintenance. I have asked that she contact her primary care provider, Jyoti Baltazar MD, for follow-up on this health maintenance. 8)  demonstrated consistency with prescribing. 9) Pt will follow-up in 6 weeks or sooner if needed. HISTORY OF PRESENT ILLNESS:  Zeyad Carrasco is a 59 y.o. female with history of chronic cervical and lumbar pain and was last seen on 06/25/2018. She complains of pain in the neck with burning, tingling pain that radiates down the right arm, particularly into the shoulder. Pt notes that she followed up with NICOLETTE Case for the tendonitis on her left elbow and was referred to [facility]. She works as a  for a school and notes that her job is physically demanding. Pt denies any weakness in the arms at this time. She also complains of pain in the lower back but notes that overall her back pain has been doing well. Pt admits to very intermittent pain in the right leg but notes that she walks frequently with her job. She had goiter, shortness of breath, and weight gain and notes that she then had partial thyroidectomy in 06/2018. Pt had a previous cervical fusion in 2004 or 2007 with Dr. Maria Victoria Guerin. She has a history of glaucoma and notes that she was unable to undergo a test as ordered by her cardiologist since she could not afford the $500 co-pay. Pt takes ibuprofen 800 mg up to BID as needed. Pt at this time desires to proceed with medication evaluation. Pain Scale: 3/10    PCP: Jyoti Baltazar MD     Past Medical History:   Diagnosis Date    Cellulitis of breast, left     Degenerative cervical disc     Dyslipidemia     Glaucoma     Goiter     History of echocardiogram 12/16/2011    EF 65-70%. RVSP 30-35. No significant valvular heart disease.     Hypercholesterolemia     Hypertension     Hypertension     Left leg pain     Lower extremity peripheral arterial testing 2012    No peripheral arterial disease bilaterally at rest.  Exercise deferred. R IVORY 1.06.  L IVORY 1.06.    Normal nuclear stress test 2005    No ischemia or prior infarction. EF 76%. No reg'l WMA. Neg EKG on max EST. Ex time 7 mins.     Thyroid disease         Social History     Socioeconomic History    Marital status: SINGLE     Spouse name: Not on file    Number of children: Not on file    Years of education: Not on file    Highest education level: Not on file   Occupational History    Not on file   Social Needs    Financial resource strain: Not on file    Food insecurity:     Worry: Not on file     Inability: Not on file    Transportation needs:     Medical: Not on file     Non-medical: Not on file   Tobacco Use    Smoking status: Former Smoker     Packs/day: 0.50     Years: 15.00     Pack years: 7.50     Last attempt to quit: 2010     Years since quittin.4    Smokeless tobacco: Never Used   Substance and Sexual Activity    Alcohol use: Yes     Comment: occasionally    Drug use: No    Sexual activity: Not on file   Lifestyle    Physical activity:     Days per week: Not on file     Minutes per session: Not on file    Stress: Not on file   Relationships    Social connections:     Talks on phone: Not on file     Gets together: Not on file     Attends Roman [employer] service: Not on file     Active member of club or organization: Not on file     Attends meetings of clubs or organizations: Not on file     Relationship status: Not on file    Intimate partner violence:     Fear of current or ex partner: Not on file     Emotionally abused: Not on file     Physically abused: Not on file     Forced sexual activity: Not on file   Other Topics Concern     Service Not Asked    Blood Transfusions Not Asked    Caffeine Concern Not Asked    Occupational Exposure Not Asked   Tatianna Priestly Hazards Not Asked    Sleep Concern Not Asked    Stress Concern Not Asked    Weight Concern Not Asked    Special Diet Not Asked    Back Care Not Asked    Exercise Not Asked    Bike Helmet Not Asked   [address] [facility] [facility] Not Asked    Self-Exams Not Asked   Social History Narrative    Not on file       Current Outpatient Medications   Medication Sig Dispense Refill    amLODIPine (NORVASC) 10 mg tablet Take 1 Tab by mouth daily. 3    calcium-cholecalciferol, d3, (CALCIUM 600 + D) 600-125 mg-unit tab Take 1 Tab by mouth daily.  methylPREDNISolone (MEDROL DOSEPACK) 4 mg tablet Per dose pack instructions 1 Dose Pack 0    ibuprofen (MOTRIN) 800 mg tablet Take 1 Tab by mouth two (2) times daily (with meals). 60 Tab 2    gabapentin (NEURONTIN) 300 mg capsule Take 1 Cap by mouth nightly for 7 days, THEN 1 Cap two (2) times a day for 23 days. 60 Cap 1    ibuprofen (MOTRIN) 800 mg tablet Take 1 Tab by mouth two (2) times daily (with meals). 90 Tab 0    diclofenac (VOLTAREN) 1 % gel Apply 4 g to affected area four (4) times daily. 100 g 4    levothyroxine (SYNTHROID) 50 mcg tablet Take 50 mcg by mouth Daily (before breakfast). 3    cetirizine (ZYRTEC) 10 mg tablet Take 10 mg by mouth daily as needed.  ibuprofen (MOTRIN) 800 mg tablet Take 800 mg by mouth every eight (8) hours as needed.  benazepril (LOTENSIN) 40 mg tablet Take 40 mg by mouth daily.  cholecalciferol, vitamin D3, (VITAMIN D3) 2,000 unit tab Take 2,000 Units by mouth daily.  aspirin 81 mg tablet Take 81 mg by mouth daily.  simvastatin (ZOCOR) 40 mg tablet Take 40 mg by mouth nightly. Allergies   Allergen Reactions    Timolol Other (comments)     \"Dropped my heart rate. \"    Ampicillin Rash    Codeine Itching    Pcn [Penicillins] Rash    Sulfa (Sulfonamide Antibiotics) Itching    Vicodin [Hydrocodone-Acetaminophen] Itching         REVIEW OF SYSTEMS    Constitutional: Negative for fever, chills, or weight change. Respiratory: Negative for cough or shortness of breath.      Cardiovascular: Negative for chest pain or palpitations. Gastrointestinal: Negative for acid reflux, change in bowel habits, or constipation. Genitourinary: Negative for dysuria and flank pain. Musculoskeletal: Positive for cervical and lumbar pain. Skin: Negative for rash. Neurological: Negative for headaches, dizziness, or numbness. Endo/Heme/Allergies: Negative for increased bruising. Psychiatric/Behavioral: Negative for difficulty with sleep. PHYSICAL EXAMINATION  Visit Vitals  /70   Pulse (!) 58   Temp 98.1 °F (36.7 °C) (Oral)   Resp 18   Ht 5' 2\" (1.575 m)   Wt 208 lb (94.3 kg)   SpO2 95%   BMI 38.04 kg/m²       Constitutional: Awake, alert, and in no acute distress. Neurological: 3+ symmetrical DTRs in the upper extremities. 1+ symmetrical DTRs in the lower extremities. Sensation to light touch is intact. Negative Nick's sign bilaterally. Skin: warm, dry, and intact. Musculoskeletal: Tight across the upper trapezius bilaterally. Decreased range of motion with side to side cervical flexion. Tenderness to palpation in the lower lumbar region. Moderate pain with extension and axial loading. No pain with internal or external rotation of her hips. Negative straight leg raise bilaterally. Biceps  Triceps Deltoids Wrist Ext Wrist Flex Hand Intrin   Right +4/5 +4/5 +4/5 +4/5 +4/5 +4/5   Left +4/5 +4/5 +4/5 +4/5 +4/5 +4/5      Hip Flex  Quads Hamstrings Ankle DF EHL Ankle PF   Right +4/5 +4/5 +4/5 +4/5 +4/5 +4/5   Left +4/5 +4/5 +4/5 +4/5 +4/5 +4/5     IMAGING:    Lumbar spine 4V x-rays from 06/25/2018 were personally reviewed with the patient and demonstrated:  Calcification in the aorta. Multilevel degenerative facets L3-5.  Degenerative disc at L5-S1.     Cervical spine MRI from 04/28/2017 was personally reviewed with the Pt and demonstrated:  Results from [facility] on 04/28/17   MRI CERV SPINE WO CONT    Narrative EXAM:  MRI CERV SPINE WO CONT    INDICATION:   increase pain, weakness    COMPARISON: None.    TECHNIQUE: MR imaging of the cervical spine was performed including sagittal T1,  T2, STIR;  axial GRE, T2, T1.  Contrast was not administered. FINDINGS:  Straightening of the cervical spine. Susceptibility artifact from C5 to C6. Mild/moderate degenerative discogenic disease with endplate edema at Y6-A9. Slight retrolisthesis of C4 on C5. Remainder vertebral bodies maintain normal  alignment. Mild degenerative discogenic disease C3-C4, moderate degenerative  discogenic disease at C6-C7. Vertebral body heights are maintained. Narrow  cervical spinal canal on a congenital basis .  The craniocervical junction is  intact.  The course, caliber, and signal intensity of the spinal cord are  normal.      C2/3:  Mild disc bulge on the right. Mild facet arthropathy. Mild right  foraminal stenosis. Mild narrowing of the right central canal.    C3/4:  Small central disc protrusion with annular tear. Potential slight contact  with the ventral cervical cord. Mild facet arthropathy. Mild foraminal stenosis. Mild central canal stenosis, midline AP diameter is 7.8 mm.    C4/5:  Disc osteophyte complex and posterior ligamentous hypertrophy. Moderate  central canal stenosis, midline AP diameter is 6.3. Moderate left and moderate  to severe right foraminal stenosis. C5/6:  Level of fusion. Central canal and neural foramen are patent. C6/7:  Diffuse disc bulge with central disc extrusion. Disc material extends  slightly inferiorly. Moderate to severe left and moderate right foraminal  stenosis. Posterior ligamentous hypertrophy. Moderate central canal stenosis,  midline AP diameter is 6.6 mm.    C7/T1:  No significant central canal or foraminal stenosis.            Impression IMPRESSION:      1. Anterior fusion of C5 and C6. Central canal and neural foramen are patent.     2. Degenerative changes at C4-C5 and C6-C7 moderately narrowing the central  canal and moderate to severely narrowing the neural foramen. Degenerative discogenic disease with endplate edema at I3-N9. Central disc extrusion at C6-C7.                 Written by Jessica Barros, as dictated by Hayden Kim MD.  I, Dr. Hayden Kim confirm that all documentation is accurate.

## 2019-05-28 NOTE — PROGRESS NOTES
Robert Sanchez presents today for   Chief Complaint   Patient presents with    Neck Pain    Shoulder Pain     right    Arm Pain     right    Follow-up       Is someone accompanying this pt? no    Is the patient using any DME equipment during OV? no    Depression Screening:  3 most recent PHQ Screens 5/28/2019   Little interest or pleasure in doing things Not at all   Feeling down, depressed, irritable, or hopeless Not at all   Total Score PHQ 2 0       Learning Assessment:  Learning Assessment 5/28/2019   PRIMARY LEARNER Patient   HIGHEST LEVEL OF EDUCATION - PRIMARY LEARNER  DID NOT GRADUATE HIGH SCHOOL   BARRIERS PRIMARY LEARNER VISUAL   CO-LEARNER CAREGIVER No   PRIMARY LANGUAGE ENGLISH   LEARNER PREFERENCE PRIMARY DEMONSTRATION     READING     VIDEOS   ANSWERED BY patient   RELATIONSHIP SELF       Abuse Screening:  No flowsheet data found. OPIOID RISK TOOL  No flowsheet data found. Pt currently taking Antiplatelet therapy? no    Coordination of Care:  1. Have you been to the ER, urgent care clinic since your last visit? no  Hospitalized since your last visit? no    2. Have you seen or consulted any other health care providers outside of the [address] [place] [address] since your last visit? Yes, [facility] Dr. Gomez Lopez performed thyroid surgery Include any pap smears or colon screening.        Last  Checked 05/28/19

## 2019-05-28 NOTE — LETTER
5/28/19 Patient: Loulou Flores YOB: 1960 Date of Visit: 5/28/2019 241 Derick Johnson MD 
[address] [facility] [address] [facility] [address] VIA Facsimile: [phone] Liz Scott PA-C 
[address] [facility] [address] [facility] [address] VIA In Basket Dear 241 MD Liz Trujillo PA-C, Thank you for referring Ms. Loulou Flores to [address] [place] [address] for evaluation. My notes for this consultation are attached. If you have questions, please do not hesitate to call me. I look forward to following your patient along with you. Sincerely, Zuly Tucker MD

## 2019-05-28 NOTE — PATIENT INSTRUCTIONS
Neck Arthritis: Exercises Your Care Instructions Here are some examples of typical rehabilitation exercises for your condition. Start each exercise slowly. Ease off the exercise if you start to have pain. Your doctor or physical therapist will tell you when you can start these exercises and which ones will work best for you. How to do the exercises Neck stretches to the side 1. This stretch works best if you keep your shoulder down as you lean away from it. To help you remember to do this, start by relaxing your shoulders and lightly holding on to your thighs or your chair. 2. Tilt your head toward your shoulder and hold for 15 to 30 seconds. Let the weight of your head stretch your muscles. 3. Repeat 2 to 4 times toward each shoulder. Chin tuck 1. Lie on the floor with a rolled-up towel under your neck. Your head should be touching the floor. 2. Slowly bring your chin toward your chest. 
3. Hold for a count of 6, and then relax for up to 10 seconds. 4. Repeat 8 to 12 times. Active cervical rotation 1. Sit in a firm chair, or stand up straight. 2. Keeping your chin level, turn your head to the right, and hold for 15 to 30 seconds. 3. Turn your head to the left and hold for 15 to 30 seconds. 4. Repeat 2 to 4 times to each side. Shoulder blade squeeze 1. While standing, squeeze your shoulder blades together. 2. Do not raise your shoulders up as you are squeezing. 3. Hold for 6 seconds. 4. Repeat 8 to 12 times. Shoulder rolls 1. Sit comfortably with your feet shoulder-width apart. You can also do this exercise standing up. 2. Roll your shoulders up, then back, and then down in a smooth, circular motion. 3. Repeat 2 to 4 times. Follow-up care is a key part of your treatment and safety. Be sure to make and go to all appointments, and call your doctor if you are having problems. It's also a good idea to know your test results and keep a list of the medicines you take. Where can you learn more? Go to http://wilson-josue.info/. Enter S728 in the search box to learn more about \"Neck Arthritis: Exercises. \" Current as of: September 20, 2018 Content Version: 11.9 © 1188-0400 [employer]. Care instructions adapted under license by [employer] (which disclaims liability or warranty for this information). If you have questions about a medical condition or this instruction, always ask your healthcare professional. [address] any warranty or liability for your use of this information. Low Back Arthritis: Exercises Your Care Instructions Here are some examples of typical rehabilitation exercises for your condition. Start each exercise slowly. Ease off the exercise if you start to have pain. Your doctor or physical therapist will tell you when you can start these exercises and which ones will work best for you. When you are not being active, find a comfortable position for rest. Some people are comfortable on the floor or a medium-firm bed with a small pillow under their head and another under their knees. Some people prefer to lie on their side with a pillow between their knees. Don't stay in one position for too long. Take short walks (10 to 20 minutes) every 2 to 3 hours. Avoid slopes, hills, and stairs until you feel better. Walk only distances you can manage without pain, especially leg pain. How to do the exercises Pelvic tilt 4. Lie on your back with your knees bent. 5. \"Brace\" your stomachtighten your muscles by pulling in and imagining your belly button moving toward your spine. 6. Press your lower back into the floor. You should feel your hips and pelvis rock back. 7. Hold for 6 seconds while breathing smoothly. 8. Relax and allow your pelvis and hips to rock forward. 9. Repeat 8 to 12 times. Back stretches 5. Get down on your hands and knees on the floor. 6. Relax your head and allow it to droop. Round your back up toward the ceiling until you feel a nice stretch in your upper, middle, and lower back. Hold this stretch for as long as it feels comfortable, or about 15 to 30 seconds. 7. Return to the starting position with a flat back while you are on your hands and knees. 8. Let your back sway by pressing your stomach toward the floor. Lift your buttocks toward the ceiling. 9. Hold this position for 15 to 30 seconds. 10. Repeat 2 to 4 times. Follow-up care is a key part of your treatment and safety. Be sure to make and go to all appointments, and call your doctor if you are having problems. It's also a good idea to know your test results and keep a list of the medicines you take. Where can you learn more? Go to http://wilson-josue.info/. Enter I981 in the search box to learn more about \"Low Back Arthritis: Exercises. \" Current as of: September 20, 2018 Content Version: 11.9 © 7512-0120 [employer]. Care instructions adapted under license by [employer] (which disclaims liability or warranty for this information). If you have questions about a medical condition or this instruction, always ask your healthcare professional. [address] any warranty or liability for your use of this information. Starting a Weight Loss Plan: Care Instructions Your Care Instructions If you are thinking about losing weight, it can be hard to know where to start. Your doctor can help you set up a weight loss plan that best meets your needs. You may want to take a class on nutrition or exercise, or join a weight loss support group. If you have questions about how to make changes to your eating or exercise habits, ask your doctor about seeing a registered dietitian or an exercise specialist. 
It can be a big challenge to lose weight.  But you do not have to make huge changes at once. Make small changes, and stick with them. When those changes become habit, add a few more changes. If you do not think you are ready to make changes right now, try to pick a date in the future. Make an appointment to see your doctor to discuss whether the time is right for you to start a plan. Follow-up care is a key part of your treatment and safety. Be sure to make and go to all appointments, and call your doctor if you are having problems. It's also a good idea to know your test results and keep a list of the medicines you take. How can you care for yourself at home? · Set realistic goals. Many people expect to lose much more weight than is likely. A weight loss of 5% to 10% of your body weight may be enough to improve your health. · Get family and friends involved to provide support. Talk to them about why you are trying to lose weight, and ask them to help. They can help by participating in exercise and having meals with you, even if they may be eating something different. · Find what works best for you. If you do not have time or do not like to cook, a program that offers meal replacement bars or shakes may be better for you. Or if you like to prepare meals, finding a plan that includes daily menus and recipes may be best. 
· Ask your doctor about other health professionals who can help you achieve your weight loss goals. ? A dietitian can help you make healthy changes in your diet. ? An exercise specialist or  can help you develop a safe and effective exercise program. 
? A counselor or psychiatrist can help you cope with issues such as depression, anxiety, or family problems that can make it hard to focus on weight loss. · Consider joining a support group for people who are trying to lose weight. Your doctor can suggest groups in your area. Where can you learn more? Go to http://wilson-josue.info/. Enter B605 in the search box to learn more about \"Starting a Weight Loss Plan: Care Instructions. \" Current as of: June 25, 2018 Content Version: 11.9 © 5058-0743 [employer]. Care instructions adapted under license by [employer] (which disclaims liability or warranty for this information). If you have questions about a medical condition or this instruction, always ask your healthcare professional. [place] [address] any warranty or liability for your use of this information.

## 2019-05-28 NOTE — PROGRESS NOTES
Resubmitted patient's gabapentin as Sig was incorrect. Resubmitted Gabapentin order, sent to patient's [facility] Called and spoke with Merari Moreno pharmacist at [address] [place] [address] aware of correct Rx. No further action required at this time.

## 2019-06-18 ENCOUNTER — HOSPITAL ENCOUNTER (OUTPATIENT)
Dept: LAB | Age: 59
Discharge: HOME | End: 2019-06-18

## 2019-06-18 LAB — SENTARA SPECIMEN COL,SENBCF: NORMAL

## 2019-06-18 PROCEDURE — 99001 SPECIMEN HANDLING PT-LAB: CPT

## 2019-10-18 ENCOUNTER — HOSPITAL ENCOUNTER (OUTPATIENT)
Dept: LAB | Age: 59
Discharge: HOME | End: 2019-10-18

## 2019-10-18 LAB — SENTARA SPECIMEN COL,SENBCF: NORMAL

## 2019-10-18 PROCEDURE — 99001 SPECIMEN HANDLING PT-LAB: CPT

## 2020-01-16 ENCOUNTER — HOSPITAL ENCOUNTER (OUTPATIENT)
Dept: MAMMOGRAPHY | Age: 60
Discharge: HOME | End: 2020-01-16
Attending: PHYSICIAN ASSISTANT
Payer: COMMERCIAL

## 2020-01-16 DIAGNOSIS — Z12.31 SCREENING MAMMOGRAM FOR HIGH-RISK PATIENT: ICD-10-CM

## 2020-01-16 PROCEDURE — 77067 SCR MAMMO BI INCL CAD: CPT

## 2020-01-28 ENCOUNTER — HOSPITAL ENCOUNTER (OUTPATIENT)
Dept: MAMMOGRAPHY | Age: 60
Discharge: HOME | End: 2020-01-28
Attending: PHYSICIAN ASSISTANT
Payer: COMMERCIAL

## 2020-01-28 ENCOUNTER — HOSPITAL ENCOUNTER (OUTPATIENT)
Dept: ULTRASOUND IMAGING | Age: 60
Discharge: HOME | End: 2020-01-28
Attending: PHYSICIAN ASSISTANT
Payer: COMMERCIAL

## 2020-01-28 DIAGNOSIS — N64.89 BREAST ASYMMETRY: ICD-10-CM

## 2020-01-28 DIAGNOSIS — R92.8 ABNORMAL MAMMOGRAM: ICD-10-CM

## 2020-01-28 PROCEDURE — 76642 ULTRASOUND BREAST LIMITED: CPT

## 2020-01-28 PROCEDURE — 77061 BREAST TOMOSYNTHESIS UNI: CPT

## 2020-01-31 ENCOUNTER — HOSPITAL ENCOUNTER (OUTPATIENT)
Dept: MAMMOGRAPHY | Age: 60
Discharge: HOME | End: 2020-01-31
Attending: PHYSICIAN ASSISTANT

## 2020-01-31 DIAGNOSIS — N63.0 LUMP OR MASS IN BREAST: ICD-10-CM

## 2020-01-31 DIAGNOSIS — R92.8 ABNORMAL MAMMOGRAM: ICD-10-CM

## 2020-05-25 DIAGNOSIS — M77.01 MEDIAL EPICONDYLITIS OF ELBOW, RIGHT: ICD-10-CM

## 2020-05-25 DIAGNOSIS — M79.601 RIGHT ARM PAIN: ICD-10-CM

## 2020-05-26 RX ORDER — DICLOFENAC SODIUM 10 MG/G
GEL TOPICAL
Qty: 100 G | Refills: 0 | Status: SHIPPED | OUTPATIENT
Start: 2020-05-26

## 2020-06-03 ENCOUNTER — HOSPITAL ENCOUNTER (OUTPATIENT)
Dept: MAMMOGRAPHY | Age: 60
Discharge: HOME | End: 2020-06-03
Attending: NURSE PRACTITIONER
Payer: COMMERCIAL

## 2020-06-03 ENCOUNTER — HOSPITAL ENCOUNTER (OUTPATIENT)
Dept: ULTRASOUND IMAGING | Age: 60
Discharge: HOME | End: 2020-06-03
Attending: NURSE PRACTITIONER
Payer: COMMERCIAL

## 2020-06-03 DIAGNOSIS — R92.8 ABNORMAL MAMMOGRAM: ICD-10-CM

## 2020-06-03 PROCEDURE — 77062 BREAST TOMOSYNTHESIS BI: CPT

## 2020-09-21 ENCOUNTER — HOSPITAL ENCOUNTER (OUTPATIENT)
Dept: LAB | Age: 60
Discharge: HOME | End: 2020-09-21

## 2020-09-21 PROCEDURE — 99001 SPECIMEN HANDLING PT-LAB: CPT

## 2020-09-28 LAB — SENTARA SPECIMEN COL,SENBCF: NORMAL

## 2020-11-12 ENCOUNTER — TRANSCRIBE ORDER (OUTPATIENT)
Dept: SCHEDULING | Age: 60
End: 2020-11-12

## 2020-11-12 DIAGNOSIS — R92.8 ABNORMAL MAMMOGRAM: Primary | ICD-10-CM

## 2020-12-29 ENCOUNTER — HOSPITAL ENCOUNTER (OUTPATIENT)
Dept: MAMMOGRAPHY | Age: 60
Discharge: HOME | End: 2020-12-29
Attending: PHYSICIAN ASSISTANT
Payer: COMMERCIAL

## 2020-12-29 ENCOUNTER — HOSPITAL ENCOUNTER (OUTPATIENT)
Dept: ULTRASOUND IMAGING | Age: 60
Discharge: HOME | End: 2020-12-29
Attending: PHYSICIAN ASSISTANT
Payer: COMMERCIAL

## 2020-12-29 DIAGNOSIS — R92.8 ABNORMAL MAMMOGRAM: ICD-10-CM

## 2020-12-29 PROCEDURE — 76642 ULTRASOUND BREAST LIMITED: CPT

## 2020-12-29 PROCEDURE — 77065 DX MAMMO INCL CAD UNI: CPT

## 2021-03-16 ENCOUNTER — HOSPITAL ENCOUNTER (OUTPATIENT)
Dept: LAB | Age: 61
Discharge: HOME | End: 2021-03-16

## 2021-03-16 LAB — XX-LABCORP SPECIMEN COL,LCBCF: NORMAL

## 2021-03-16 PROCEDURE — 99001 SPECIMEN HANDLING PT-LAB: CPT

## 2021-11-30 ENCOUNTER — TRANSCRIBE ORDER (OUTPATIENT)
Dept: SCHEDULING | Age: 61
End: 2021-11-30

## 2021-11-30 DIAGNOSIS — R92.8 ABNORMAL MAMMOGRAM: Primary | ICD-10-CM

## 2021-12-16 ENCOUNTER — HOSPITAL ENCOUNTER (OUTPATIENT)
Dept: MAMMOGRAPHY | Age: 61
Discharge: HOME | End: 2021-12-16
Attending: PHYSICIAN ASSISTANT
Payer: COMMERCIAL

## 2021-12-16 ENCOUNTER — HOSPITAL ENCOUNTER (OUTPATIENT)
Dept: ULTRASOUND IMAGING | Age: 61
Discharge: HOME | End: 2021-12-16
Attending: PHYSICIAN ASSISTANT
Payer: COMMERCIAL

## 2021-12-16 DIAGNOSIS — R92.8 ABNORMAL MAMMOGRAM: ICD-10-CM

## 2021-12-16 PROCEDURE — 76642 ULTRASOUND BREAST LIMITED: CPT

## 2021-12-16 PROCEDURE — 77062 BREAST TOMOSYNTHESIS BI: CPT

## 2022-03-19 PROBLEM — M51.36 DDD (DEGENERATIVE DISC DISEASE), LUMBAR: Status: ACTIVE | Noted: 2017-01-23

## 2022-03-19 PROBLEM — M47.816 LUMBAR FACET ARTHROPATHY: Status: ACTIVE | Noted: 2017-01-23

## 2022-03-19 PROBLEM — N62 MACROMASTIA: Status: ACTIVE | Noted: 2017-04-20

## 2022-03-19 PROBLEM — M62.838 MUSCLE SPASM: Status: ACTIVE | Noted: 2017-03-06

## 2022-03-19 PROBLEM — M51.369 DDD (DEGENERATIVE DISC DISEASE), LUMBAR: Status: ACTIVE | Noted: 2017-01-23

## 2022-03-19 PROBLEM — M79.18 MYOFASCIAL PAIN: Status: ACTIVE | Noted: 2017-04-20

## 2022-03-19 PROBLEM — M48.02 FORAMINAL STENOSIS OF CERVICAL REGION: Status: ACTIVE | Noted: 2017-05-24

## 2022-03-19 PROBLEM — M62.830 MUSCLE SPASM OF BACK: Status: ACTIVE | Noted: 2017-01-23

## 2022-03-20 PROBLEM — M54.50 ACUTE MIDLINE LOW BACK PAIN WITHOUT SCIATICA: Status: ACTIVE | Noted: 2017-03-06

## 2022-05-02 ENCOUNTER — HOSPITAL ENCOUNTER (OUTPATIENT)
Dept: LAB | Age: 62
Discharge: HOME | End: 2022-05-02

## 2022-05-02 LAB — XX-LABCORP SPECIMEN COL,LCBCF: NORMAL

## 2022-05-02 PROCEDURE — 99001 SPECIMEN HANDLING PT-LAB: CPT

## 2022-11-10 ENCOUNTER — TRANSCRIBE ORDER (OUTPATIENT)
Dept: SCHEDULING | Age: 62
End: 2022-11-10

## 2022-11-10 DIAGNOSIS — N63.12 LUMP IN UPPER INNER QUADRANT OF RIGHT BREAST: Primary | ICD-10-CM

## 2022-12-05 ENCOUNTER — HOSPITAL ENCOUNTER (OUTPATIENT)
Dept: MAMMOGRAPHY | Age: 62
Discharge: HOME | End: 2022-12-05
Attending: PHYSICIAN ASSISTANT
Payer: COMMERCIAL

## 2022-12-05 ENCOUNTER — HOSPITAL ENCOUNTER (OUTPATIENT)
Dept: ULTRASOUND IMAGING | Age: 62
Discharge: HOME | End: 2022-12-05
Attending: PHYSICIAN ASSISTANT
Payer: COMMERCIAL

## 2022-12-05 DIAGNOSIS — N63.12 LUMP IN UPPER INNER QUADRANT OF RIGHT BREAST: ICD-10-CM

## 2022-12-05 PROCEDURE — 76642 ULTRASOUND BREAST LIMITED: CPT

## 2022-12-05 PROCEDURE — 77062 BREAST TOMOSYNTHESIS BI: CPT

## 2023-07-05 ENCOUNTER — HOSPITAL ENCOUNTER (OUTPATIENT)
Facility: HOSPITAL | Age: 63
Discharge: HOME | End: 2023-07-08
Payer: MEDICARE

## 2023-07-05 DIAGNOSIS — M25.522 LEFT ELBOW PAIN: ICD-10-CM

## 2023-07-05 PROCEDURE — 73080 X-RAY EXAM OF ELBOW: CPT

## 2023-07-19 ENCOUNTER — OFFICE VISIT (OUTPATIENT)
Age: 63
End: 2023-07-19
Payer: MEDICARE

## 2023-07-19 VITALS — HEIGHT: 63 IN | TEMPERATURE: 96.9 F | BODY MASS INDEX: 35.61 KG/M2 | WEIGHT: 201 LBS

## 2023-07-19 DIAGNOSIS — M47.22 CERVICAL SPONDYLOSIS WITH RADICULOPATHY: ICD-10-CM

## 2023-07-19 DIAGNOSIS — Z96.9 RETAINED ORTHOPEDIC HARDWARE: ICD-10-CM

## 2023-07-19 DIAGNOSIS — M79.642 LEFT HAND PAIN: Primary | ICD-10-CM

## 2023-07-19 DIAGNOSIS — R20.2 PARESTHESIAS IN LEFT HAND: ICD-10-CM

## 2023-07-19 DIAGNOSIS — M54.2 NECK PAIN: ICD-10-CM

## 2023-07-19 DIAGNOSIS — Z98.1 STATUS POST CERVICAL SPINAL FUSION: ICD-10-CM

## 2023-07-19 DIAGNOSIS — M47.22 OSTEOARTHRITIS OF SPINE WITH RADICULOPATHY, CERVICAL REGION: ICD-10-CM

## 2023-07-19 PROCEDURE — 72040 X-RAY EXAM NECK SPINE 2-3 VW: CPT | Performed by: PHYSICIAN ASSISTANT

## 2023-07-19 PROCEDURE — 99214 OFFICE O/P EST MOD 30 MIN: CPT | Performed by: PHYSICIAN ASSISTANT

## 2023-07-19 PROCEDURE — 1123F ACP DISCUSS/DSCN MKR DOCD: CPT | Performed by: PHYSICIAN ASSISTANT

## 2023-07-19 PROCEDURE — 73130 X-RAY EXAM OF HAND: CPT | Performed by: PHYSICIAN ASSISTANT

## 2023-07-19 NOTE — PROGRESS NOTES
Patient: Gisselle Abbasi                MRN: [id number]       SSN: [id number]  YOB: 1960        AGE: 70 y.o. SEX: female      PCP: MIKE Lopes  07/19/23    Chief Complaint   Patient presents with    Hand Pain     Left         HISTORY:    Gisselle Abbasi is a 70 y.o. female presents to the office with a complaint of left hand numbness tingling with  strength weakness. Patient has a complicated medical history to include a cervical fusion that was done under the direction of Dr. Ngozi Loya back in 2004. She has recently been seen in 2020 under the care of Dr. Vianey Waller and treated for what at that time was acute on chronic low back and neck pain. She has not followed with Dr. Ngozi Loya for over 15 years. Regarding the numbness tingling in the left hand it affects the top of her left ring and little finger. She is right-hand dominant but notes  strength weakness on the left. She did not have the symptoms prior to the cervical fusion which was done on C5-6. She has a distant history of an MRI dating back to 2017 as below of the cervical spine. Cervical spine MRI from 04/28/2017 was personally reviewed with the Pt and demonstrated:     Results from Hospital Encounter on 04/28/17     MRI CERV SPINE WO CONT           Narrative  EXAM:  MRI CERV SPINE WO CONT    INDICATION:   increase pain, weakness    COMPARISON: None. TECHNIQUE: MR imaging of  the cervical spine was performed including sagittal T1,  T2, STIR;  axial GRE, T2, T1. Contrast was not administered. FINDINGS:  Straightening of the cervical spine. Susceptibility artifact from C5 to C6. Mild/moderate degenerative  discogenic disease with endplate edema at W9-Z4. Slight retrolisthesis of C4 on C5. Remainder vertebral bodies maintain normal  alignment. Mild degenerative discogenic disease C3-C4, moderate degenerative  discogenic disease at C6-C7. Vertebral  body heights are maintained.  Narrow  cervical spinal

## 2023-07-20 ENCOUNTER — TELEPHONE (OUTPATIENT)
Age: 63
End: 2023-07-20

## 2023-07-20 DIAGNOSIS — F41.1 ANXIETY STATE: Primary | ICD-10-CM

## 2023-07-20 RX ORDER — DIAZEPAM 5 MG/1
TABLET ORAL
Qty: 3 TABLET | Refills: 0 | Status: SHIPPED | OUTPATIENT
Start: 2023-07-20 | End: 2023-08-21

## 2023-07-20 NOTE — TELEPHONE ENCOUNTER
Patient called stating that she would like a prescription for her upcoming MRI due to having  a hard time in the machine for her nerves. MRI schedule for 7/24    Pharmacy: [facility].     Patient can be reached at : [phone]

## 2023-07-24 ENCOUNTER — HOSPITAL ENCOUNTER (OUTPATIENT)
Facility: HOSPITAL | Age: 63
Discharge: HOME | End: 2023-07-27
Payer: MEDICARE

## 2023-07-24 DIAGNOSIS — M47.22 CERVICAL SPONDYLOSIS WITH RADICULOPATHY: ICD-10-CM

## 2023-07-24 DIAGNOSIS — M47.22 OSTEOARTHRITIS OF SPINE WITH RADICULOPATHY, CERVICAL REGION: ICD-10-CM

## 2023-07-24 DIAGNOSIS — Z98.1 STATUS POST CERVICAL SPINAL FUSION: ICD-10-CM

## 2023-07-24 PROCEDURE — 72141 MRI NECK SPINE W/O DYE: CPT

## 2023-09-18 ENCOUNTER — TELEPHONE (OUTPATIENT)
Age: 63
End: 2023-09-18

## 2023-09-18 DIAGNOSIS — R20.2 PARESTHESIAS IN LEFT HAND: Primary | ICD-10-CM

## 2023-09-18 NOTE — TELEPHONE ENCOUNTER
Patient called and said she was referred to have an EMG done on her Left Hand. That she was going to have it done through [facility], but due to her insurance is [employer], and we are out of network,  she is unable to have it done with [facility]. Patient is asking if Dr. Oscar Lares could refer her to [facility] to have the EMG done. Patient tel. [phone]. Note : patient ref order for the EMG is from 7/19/23 from [facility].

## 2023-11-08 ENCOUNTER — PROCEDURE VISIT (OUTPATIENT)
Age: 63
End: 2023-11-08
Payer: MEDICARE

## 2023-11-08 VITALS
RESPIRATION RATE: 18 BRPM | HEART RATE: 61 BPM | DIASTOLIC BLOOD PRESSURE: 70 MMHG | WEIGHT: 202.4 LBS | HEIGHT: 63 IN | SYSTOLIC BLOOD PRESSURE: 133 MMHG | BODY MASS INDEX: 35.86 KG/M2 | TEMPERATURE: 97.9 F

## 2023-11-08 DIAGNOSIS — R20.2 PARESTHESIAS IN LEFT HAND: Primary | ICD-10-CM

## 2023-11-08 PROCEDURE — 95909 NRV CNDJ TST 5-6 STUDIES: CPT | Performed by: PHYSICAL MEDICINE & REHABILITATION

## 2023-11-08 PROCEDURE — 95886 MUSC TEST DONE W/N TEST COMP: CPT | Performed by: PHYSICAL MEDICINE & REHABILITATION

## 2023-11-08 NOTE — PROGRESS NOTES
[facility]  [address] [place] [address] Dr  Phone: [phone]  Fax: [phone]    Maritza Moody  : 1960  PCP: MIKE Bunn  2023    ELECTROMYOGRAPHY AND NERVE CONDUCTION STUDIES    Maritza Moody was referred by MIKE Powell for electrodiagnostic evaluation of numbness and tingling of LUE    NCV & EMG Findings:  All nerve conduction studies (as indicated in the following tables) were within normal limits. INTERPRETATION  This was a normal nerve conduction and EMG study showing there to be no signs of neuropathy, myopathy, or radiculopathy in the nerves and muscles tested. Of note, incidental findings of numerous CRDs in the left lower cervical paraspinal musculature without any correlating findings in the myotome or other signs of muscle membrane instability. CLINICAL INTERPRETATION  Her electrodiagnostic findings do not appear to explain her left arm symptoms. HISTORY OF PRESENT ILLNESS  Maritza Moody is a 70 y.o. female. Pt presents today with LUE EMG evaluation for numbness and tingling of left hand, most prominent of the top of her ring finger and pinky, x 3-4 months. Notes that sxs seem to have improved slightly. She is RHD. She has hx of C5-6 ACDF. She presents with positive Tinel's sign of L. PAST MEDICAL HISTORY   Past Medical History:   Diagnosis Date    Benign hypertensive heart disease without heart failure     Cellulitis of breast     Degenerative cervical disc     Dyslipidemia     Glaucoma     Goiter     History of echocardiogram 2011    EF 65-70%. RVSP 30-35. No significant valvular heart disease. Hypercholesterolemia     Hypertension     Left leg pain     PAD (peripheral artery disease) ([address]) 2012    No peripheral arterial disease bilaterally at rest.  Exercise deferred. R SAMANTHA 1.06.  L SAMANTHA 1.06.     Thyroid disease        Past Surgical History:   Procedure

## 2023-11-13 ENCOUNTER — TRANSCRIBE ORDERS (OUTPATIENT)
Facility: HOSPITAL | Age: 63
End: 2023-11-13

## 2023-11-13 DIAGNOSIS — Z12.31 SCREENING MAMMOGRAM FOR HIGH-RISK PATIENT: Primary | ICD-10-CM

## 2023-12-06 ENCOUNTER — HOSPITAL ENCOUNTER (OUTPATIENT)
Facility: HOSPITAL | Age: 63
Discharge: HOME | End: 2023-12-09
Payer: MEDICARE

## 2023-12-06 VITALS — HEIGHT: 63 IN | BODY MASS INDEX: 35.86 KG/M2 | WEIGHT: 202.38 LBS

## 2023-12-06 DIAGNOSIS — Z12.31 SCREENING MAMMOGRAM FOR HIGH-RISK PATIENT: ICD-10-CM

## 2023-12-06 PROCEDURE — 77067 SCR MAMMO BI INCL CAD: CPT

## 2023-12-15 ENCOUNTER — HOSPITAL ENCOUNTER (OUTPATIENT)
Facility: HOSPITAL | Age: 63
End: 2023-12-15
Payer: MEDICARE

## 2023-12-15 DIAGNOSIS — Z13.6 SCREENING FOR AAA (AORTIC ABDOMINAL ANEURYSM): ICD-10-CM

## 2023-12-15 PROCEDURE — 76706 US ABDL AORTA SCREEN AAA: CPT

## 2024-04-01 ENCOUNTER — HOSPITAL ENCOUNTER (OUTPATIENT)
Facility: HOSPITAL | Age: 64
Discharge: HOME | End: 2024-04-04

## 2024-04-01 LAB — LABCORP SPECIMEN COLLECTION: NORMAL

## 2024-04-01 PROCEDURE — 99001 SPECIMEN HANDLING PT-LAB: CPT

## 2024-06-10 ENCOUNTER — OFFICE VISIT (OUTPATIENT)
Age: 64
End: 2024-06-10
Payer: MEDICARE

## 2024-06-10 VITALS — WEIGHT: 203.4 LBS | TEMPERATURE: 97 F | BODY MASS INDEX: 37.43 KG/M2 | HEIGHT: 62 IN

## 2024-06-10 DIAGNOSIS — M71.21 POPLITEAL CYST, RIGHT: ICD-10-CM

## 2024-06-10 DIAGNOSIS — M25.561 RIGHT KNEE PAIN, UNSPECIFIED CHRONICITY: Primary | ICD-10-CM

## 2024-06-10 DIAGNOSIS — M22.41 CHONDROMALACIA, PATELLA, RIGHT: ICD-10-CM

## 2024-06-10 DIAGNOSIS — M17.11 UNILATERAL PRIMARY OSTEOARTHRITIS, RIGHT KNEE: ICD-10-CM

## 2024-06-10 PROCEDURE — 20610 DRAIN/INJ JOINT/BURSA W/O US: CPT | Performed by: SPECIALIST

## 2024-06-10 PROCEDURE — 99203 OFFICE O/P NEW LOW 30 MIN: CPT | Performed by: SPECIALIST

## 2024-06-10 PROCEDURE — 1123F ACP DISCUSS/DSCN MKR DOCD: CPT | Performed by: SPECIALIST

## 2024-06-10 PROCEDURE — 73562 X-RAY EXAM OF KNEE 3: CPT | Performed by: SPECIALIST

## 2024-06-10 RX ORDER — BETAMETHASONE SODIUM PHOSPHATE AND BETAMETHASONE ACETATE 3; 3 MG/ML; MG/ML
3 INJECTION, SUSPENSION INTRA-ARTICULAR; INTRALESIONAL; INTRAMUSCULAR; SOFT TISSUE ONCE
Status: COMPLETED | OUTPATIENT
Start: 2024-06-10 | End: 2024-06-10

## 2024-06-10 RX ORDER — BUPIVACAINE HYDROCHLORIDE 5 MG/ML
4 INJECTION, SOLUTION PERINEURAL ONCE
Status: COMPLETED | OUTPATIENT
Start: 2024-06-10 | End: 2024-06-10

## 2024-06-10 RX ADMIN — BETAMETHASONE SODIUM PHOSPHATE AND BETAMETHASONE ACETATE 3 MG: 3; 3 INJECTION, SUSPENSION INTRA-ARTICULAR; INTRALESIONAL; INTRAMUSCULAR; SOFT TISSUE at 15:02

## 2024-06-10 RX ADMIN — BUPIVACAINE HYDROCHLORIDE 20 MG: 5 INJECTION, SOLUTION PERINEURAL at 15:02

## 2024-06-10 NOTE — PROGRESS NOTES
Patient: Arti Antonio                MRN: [id number]       SSN: [id number]  YOB: 1960        AGE: 63 y.o.        SEX: female      PCP: Tiffany Cartagena PA  06/10/24    Chief Complaint   Patient presents with    Knee Pain     Right knee      HISTORY:  Arti Antonio is a 63 y.o. female seen for 1 year history of right knee pain.  She denies any recent injury.  She feels pain with standing, walking and stair climbing.  She experiences startup pain after sitting. She has also been experiencing some left knee pain. She attributes her knee pain to wear and tear from many years working as a .    She was previously seen by Dr. Chacon for neck pain.     Occupation, etc: Ms. Antonio is retired. She previously worked as a  for [employer] with [employer] [employer]. She lives in [place] with her sister. She has 1 adult daughter, 1 adult son, and a 15 yo granddaughter. She raised her children as a single mother. She is needle-phobic. She is pre-diabetic. Per pt, her last A1C was 6.1. She takes Aspirin. She weighs 203 lbs and is 5'2\".   Wt Readings from Last 3 Encounters:   06/10/24 92.3 kg (203 lb 6.4 oz)   23 91.8 kg (202 lb 6.1 oz)   23 91.8 kg (202 lb 6.4 oz)      Body mass index is 37.2 kg/m².    Patient Active Problem List   Diagnosis    Muscle spasm of back    Dyslipidemia    Foraminal stenosis of cervical region    Neuropathic pain of foot    HTN (hypertension)    Macromastia    Myofascial pain    Diffuse cystic mastopathy    Lumbar facet arthropathy    DDD (degenerative disc disease), lumbar    Cellulitis of breast    Muscle spasm    Acute midline low back pain without sciatica       Social History     Tobacco Use    Smoking status: Former     Current packs/day: 0.00     Types: Cigarettes     Quit date: 2010     Years since quittin.4    Smokeless tobacco: Never   Substance Use Topics    Alcohol use: Yes    Drug use:

## 2024-07-25 NOTE — PROGRESS NOTES
2010     Years since quittin.6    Smokeless tobacco: Never   Substance Use Topics    Alcohol use: Yes    Drug use: No        Allergies   Allergen Reactions    Timolol Other (See Comments)     \"Dropped my heart rate.\"    Codeine Itching    Hydrocodone-Acetaminophen Itching    Sulfa Antibiotics Itching    Penicillins Rash        Current Outpatient Medications   Medication Sig    amLODIPine (NORVASC) 10 MG tablet Take 1 tablet by mouth daily    benazepril (LOTENSIN) 40 MG tablet Take 40 mg by mouth daily    Calcium Carbonate-Vitamin D 600-3.125 MG-MCG TABS Take 1 tablet by mouth daily    cetirizine (ZYRTEC) 10 MG tablet Take 10 mg by mouth daily as needed    Cholecalciferol 50 MCG (2000 UT) TABS Take 2,000 Units by mouth daily    diclofenac sodium (VOLTAREN) 1 % GEL APPLY 4 GRAMS TO AFFECTED AREA FOUR TIMES A DAY    ibuprofen (ADVIL;MOTRIN) 800 MG tablet Take 800 mg by mouth 2 times daily (with meals)    levothyroxine (SYNTHROID) 50 MCG tablet Take 50 mcg by mouth every morning (before breakfast)    methylPREDNISolone (MEDROL DOSEPACK) 4 MG tablet Per dose pack instructions    simvastatin (ZOCOR) 40 MG tablet Take 40 mg by mouth     Current Facility-Administered Medications   Medication Dose Route Frequency    sodium hyaluronate (EUFLEXXA, HYALGAN) injection 20 mg  20 mg Intra-artICUlar Once        PHYSICAL EXAMINATION:  Ht 1.575 m (5' 2\")   Wt 92.1 kg (203 lb)   BMI 37.13 kg/m²      ORTHO EXAMINATION:  Examination Right knee Left knee   Skin Intact Intact   Range of motion 95-5 110-0   Effusion + -   Medial joint line tenderness + -   Lateral joint line tenderness - -   Popliteal tenderness - -   Osteophytes palpable + -   Lucius’s - -   Patella crepitus - -   Anterior drawer - -   Lateral laxity - -   Medial laxity - -   Varus deformity - -   Valgus deformity - -   Pretibial edema - -   Calf tenderness - -      RADIOGRAPHS:   06/10/24  3 VIEWS RT KNEE MAHENDRA  Three views of right knee: no fractures,

## 2024-08-01 ENCOUNTER — OFFICE VISIT (OUTPATIENT)
Age: 64
End: 2024-08-01

## 2024-08-01 VITALS — WEIGHT: 203 LBS | BODY MASS INDEX: 37.36 KG/M2 | HEIGHT: 62 IN

## 2024-08-01 DIAGNOSIS — M17.11 UNILATERAL PRIMARY OSTEOARTHRITIS, RIGHT KNEE: Primary | ICD-10-CM

## 2024-08-01 RX ORDER — HYALURONATE SODIUM 10 MG/ML
20 SYRINGE (ML) INTRAARTICULAR ONCE
Status: COMPLETED | OUTPATIENT
Start: 2024-08-01 | End: 2024-08-01

## 2024-08-01 RX ADMIN — Medication 20 MG: at 08:53

## 2024-08-01 NOTE — PROGRESS NOTES
Patient: Arti Antonio                MRN: [id number]       SSN: [id number]  YOB: 1960        AGE: 64 y.o.        SEX: female  There is no height or weight on file to calculate BMI.    PCP: Tiffany Cartagena PA  08/08/24    No chief complaint on file.    HISTORY:  Arti Antonio is a 64 y.o. female who is seen for right knee pain. She presents today for her second injection in the Euflexxa visco supplementation series.      ICD-10-CM    1. Unilateral primary osteoarthritis, right knee  M17.11          PROCEDURE:  Ms. Antonio's right knee injected with 2 cc of Euflexxa.     Chart reviewed for the following:   Johnnie FLEMING MD, have reviewed the History, Physical and updated the Allergic reactions for Arti Antonio     TIME OUT performed immediately prior to start of procedure:  Johnnie FLEMING MD, have performed the following reviews on Arti Antonio prior to the start of the procedure:            * Patient was identified by name and date of birth   * Agreement on procedure being performed was verified  * Risks and Benefits explained to the patient  * Procedure site verified and marked as necessary  * Patient was positioned for comfort  * Consent was obtained     Time: 8:02 AM     Date of procedure: 8/8/2024    Procedure performed by:  Johnnie Pires MD    Ms. Antonio tolerated the procedure well with no complications.    PLAN: Ms. Antonio's right knee injected with 2 cc of Euflexxa. Ms. Antonio will follow up in one week to complete her visco supplementation injection series.    Documentation by dalton Hammond, as documented by Johnnie Pires MD.

## 2024-08-08 ENCOUNTER — OFFICE VISIT (OUTPATIENT)
Age: 64
End: 2024-08-08

## 2024-08-08 VITALS — WEIGHT: 204.8 LBS | BODY MASS INDEX: 37.69 KG/M2 | HEIGHT: 62 IN

## 2024-08-08 DIAGNOSIS — M17.11 UNILATERAL PRIMARY OSTEOARTHRITIS, RIGHT KNEE: Primary | ICD-10-CM

## 2024-08-08 RX ORDER — HYALURONATE SODIUM 10 MG/ML
20 SYRINGE (ML) INTRAARTICULAR ONCE
Status: COMPLETED | OUTPATIENT
Start: 2024-08-08 | End: 2024-08-08

## 2024-08-08 RX ADMIN — Medication 20 MG: at 08:50

## 2024-08-08 NOTE — PROGRESS NOTES
Patient: Arti Antonio                MRN: [id number]       SSN: [id number]  YOB: 1960        AGE: 64 y.o.        SEX: female  Body mass index is 37.31 kg/m².    PCP: Tiffany Cartagena PA  08/15/24    Chief Complaint   Patient presents with    Knee Pain     right     HISTORY:  Arti Antonio is a 64 y.o. female who is seen for right knee pain. She presents today for her third injection in the Euflexxa visco supplementation series.    PROCEDURE:  Ms. Antonio's right knee injected with 2 cc of Euflexxa.     TIME OUT performed immediately prior to start of procedure:  I, Johnnie Pires MD, have performed the following reviews on Arti Antonio prior to the start of the procedure:            * Patient was identified by name and date of birth   * Agreement on procedure being performed was verified  * Risks and Benefits explained to the patient  * Procedure site verified and marked as necessary  * Patient was positioned for comfort  * Consent was obtained     Time: 8:37 AM     Date of procedure: 8/15/2024    Procedure performed by:  Jonhnie Pires MD    Ms. Antonio tolerated the procedure well with no complications      ICD-10-CM    1. Unilateral primary osteoarthritis, right knee  M17.11 DRAIN/INJECT LARGE JOINT/BURSA     sodium hyaluronate (EUFLEXXA, HYALGAN) injection 20 mg        PLAN: Ms. Antonio's right knee injected with 2 cc of Euflexxa. Ms. Antonio will follow up PRN now that she has completed her visco supplementation injection series.     Documentation by dalton Hammond, as documented by Johnnie Pires MD.

## 2024-08-15 ENCOUNTER — OFFICE VISIT (OUTPATIENT)
Age: 64
End: 2024-08-15

## 2024-08-15 VITALS — TEMPERATURE: 97.7 F | BODY MASS INDEX: 37.54 KG/M2 | HEIGHT: 62 IN | WEIGHT: 204 LBS

## 2024-08-15 DIAGNOSIS — M17.11 UNILATERAL PRIMARY OSTEOARTHRITIS, RIGHT KNEE: Primary | ICD-10-CM

## 2024-08-15 RX ORDER — HYALURONATE SODIUM 10 MG/ML
20 SYRINGE (ML) INTRAARTICULAR ONCE
Status: COMPLETED | OUTPATIENT
Start: 2024-08-15 | End: 2024-08-15

## 2024-08-15 RX ADMIN — Medication 20 MG: at 08:41

## 2024-11-27 ENCOUNTER — HOSPITAL ENCOUNTER (OUTPATIENT)
Facility: HOSPITAL | Age: 64
Setting detail: SPECIMEN
Discharge: HOME | End: 2024-11-30

## 2024-11-27 LAB — LABCORP SPECIMEN COLLECTION: NORMAL

## 2024-11-27 PROCEDURE — 99001 SPECIMEN HANDLING PT-LAB: CPT

## 2024-12-09 ENCOUNTER — HOSPITAL ENCOUNTER (OUTPATIENT)
Facility: HOSPITAL | Age: 64
Discharge: HOME | End: 2024-12-12
Payer: MEDICARE

## 2024-12-09 VITALS — WEIGHT: 201 LBS | BODY MASS INDEX: 37.95 KG/M2 | HEIGHT: 61 IN

## 2024-12-09 DIAGNOSIS — N64.4 BREAST PAIN, RIGHT: ICD-10-CM

## 2024-12-09 PROCEDURE — 76642 ULTRASOUND BREAST LIMITED: CPT

## 2024-12-09 PROCEDURE — G0279 TOMOSYNTHESIS, MAMMO: HCPCS

## 2025-02-24 ENCOUNTER — OFFICE VISIT (OUTPATIENT)
Age: 65
End: 2025-02-24
Payer: MEDICARE

## 2025-02-24 VITALS — BODY MASS INDEX: 37.97 KG/M2 | WEIGHT: 201.1 LBS | HEIGHT: 61 IN

## 2025-02-24 DIAGNOSIS — M71.21 POPLITEAL CYST, RIGHT: ICD-10-CM

## 2025-02-24 DIAGNOSIS — G89.29 CHRONIC PAIN OF RIGHT KNEE: Primary | ICD-10-CM

## 2025-02-24 DIAGNOSIS — M17.11 UNILATERAL PRIMARY OSTEOARTHRITIS, RIGHT KNEE: ICD-10-CM

## 2025-02-24 DIAGNOSIS — M22.41 CHONDROMALACIA, PATELLA, RIGHT: ICD-10-CM

## 2025-02-24 DIAGNOSIS — M25.561 CHRONIC PAIN OF RIGHT KNEE: Primary | ICD-10-CM

## 2025-02-24 PROCEDURE — 1123F ACP DISCUSS/DSCN MKR DOCD: CPT | Performed by: SPECIALIST

## 2025-02-24 PROCEDURE — 99213 OFFICE O/P EST LOW 20 MIN: CPT | Performed by: SPECIALIST

## 2025-02-24 PROCEDURE — 1125F AMNT PAIN NOTED PAIN PRSNT: CPT | Performed by: SPECIALIST

## 2025-02-24 PROCEDURE — 1159F MED LIST DOCD IN RCRD: CPT | Performed by: SPECIALIST

## 2025-02-24 PROCEDURE — 20610 DRAIN/INJ JOINT/BURSA W/O US: CPT | Performed by: SPECIALIST

## 2025-02-24 PROCEDURE — 1160F RVW MEDS BY RX/DR IN RCRD: CPT | Performed by: SPECIALIST

## 2025-02-24 RX ORDER — BETAMETHASONE SODIUM PHOSPHATE AND BETAMETHASONE ACETATE 3; 3 MG/ML; MG/ML
3 INJECTION, SUSPENSION INTRA-ARTICULAR; INTRALESIONAL; INTRAMUSCULAR; SOFT TISSUE ONCE
Status: COMPLETED | OUTPATIENT
Start: 2025-02-24 | End: 2025-02-24

## 2025-02-24 RX ORDER — BUPIVACAINE HYDROCHLORIDE 5 MG/ML
4 INJECTION, SOLUTION PERINEURAL ONCE
Status: COMPLETED | OUTPATIENT
Start: 2025-02-24 | End: 2025-02-24

## 2025-02-24 RX ADMIN — BETAMETHASONE SODIUM PHOSPHATE AND BETAMETHASONE ACETATE 3 MG: 3; 3 INJECTION, SUSPENSION INTRA-ARTICULAR; INTRALESIONAL; INTRAMUSCULAR; SOFT TISSUE at 15:32

## 2025-02-24 RX ADMIN — BUPIVACAINE HYDROCHLORIDE 20 MG: 5 INJECTION, SOLUTION PERINEURAL at 15:33

## 2025-02-24 NOTE — PROGRESS NOTES
Patient: Arti Antonio                MRN: [id number]       SSN: [id number]  YOB: 1960        AGE: 64 y.o.        SEX: female      PCP: Tiffany Cartagena PA  02/24/25    Chief Complaint   Patient presents with    Knee Pain     RIGHT-     HISTORY:  Arti Antonio is a 64 y.o. female who is seen for increased right knee pain and swelling. Patient successfully completed a right knee Euflexxa series on 8/15/24 but her pain has returned. She denies any recent injury. Her pain radiates to her right ankle. She feels pain with standing, walking and stair climbing. She experiences startup pain after sitting. She has also been experiencing some left knee pain. She attributes her knee pain to wear and tear from many years working as a .     She was previously seen by Dr. Chacon for neck pain.      Occupation, etc: Ms. Antonio is retired. She previously worked as a  for [employer]. She lives in [place] with her sister. She has 1 adult daughter, 1 adult son, and a 15 yo granddaughter. She raised her children as a single mother. She is needle-phobic. She is pre-diabetic. Per pt, her last A1C was 6.1. She takes Aspirin. She weighs 201 lbs and is 5'2\".   Wt Readings from Last 3 Encounters:   02/24/25 91.2 kg (201 lb 1.6 oz)   12/09/24 91.2 kg (201 lb)   08/15/24 92.5 kg (204 lb)      Body mass index is 38 kg/m².    Patient Active Problem List   Diagnosis    Muscle spasm of back    Dyslipidemia    Foraminal stenosis of cervical region    Neuropathic pain of foot    HTN (hypertension)    Macromastia    Myofascial pain    Diffuse cystic mastopathy    Lumbar facet arthropathy    DDD (degenerative disc disease), lumbar    Cellulitis of breast    Muscle spasm    Acute midline low back pain without sciatica       Social History     Tobacco Use    Smoking status: Former     Current packs/day: 0.00     Types: Cigarettes     Quit date: 12/16/2010     Years

## 2025-05-23 ENCOUNTER — HOSPITAL ENCOUNTER (OUTPATIENT)
Facility: HOSPITAL | Age: 65
Setting detail: SPECIMEN
Discharge: HOME | End: 2025-05-26

## 2025-05-23 LAB — LABCORP SPECIMEN COLLECTION: NORMAL

## 2025-05-23 PROCEDURE — 99001 SPECIMEN HANDLING PT-LAB: CPT
